# Patient Record
Sex: FEMALE | Race: WHITE | NOT HISPANIC OR LATINO | Employment: OTHER
[De-identification: names, ages, dates, MRNs, and addresses within clinical notes are randomized per-mention and may not be internally consistent; named-entity substitution may affect disease eponyms.]

---

## 2017-04-19 ENCOUNTER — PRIOR ORIGINAL RECORDS (OUTPATIENT)
Dept: OTHER | Age: 67
End: 2017-04-19

## 2017-04-19 ENCOUNTER — HOSPITAL (OUTPATIENT)
Dept: OTHER | Age: 67
End: 2017-04-19
Attending: RADIOLOGY

## 2017-06-25 ENCOUNTER — LAB SERVICES (OUTPATIENT)
Dept: OTHER | Age: 67
End: 2017-06-25

## 2017-06-25 ENCOUNTER — HOSPITAL (OUTPATIENT)
Dept: OTHER | Age: 67
End: 2017-06-25
Attending: INTERNAL MEDICINE

## 2017-06-25 ENCOUNTER — IMAGING SERVICES (OUTPATIENT)
Dept: OTHER | Age: 67
End: 2017-06-25

## 2017-06-25 ENCOUNTER — PRIOR ORIGINAL RECORDS (OUTPATIENT)
Dept: OTHER | Age: 67
End: 2017-06-25

## 2017-06-25 LAB
ANALYZER ANC (IANC): NORMAL
ANALYZER ANC (IANC): NORMAL
ANION GAP SERPL CALC-SCNC: 17 MMOL/L (ref 10–20)
ANION GAP SERPL CALC-SCNC: 17 MMOL/L (ref 10–20)
APTT PPP: 26 SEC (ref 22–30)
APTT PPP: 26 SECONDS (ref 22–30)
APTT PPP: 50 SECONDS (ref 22–30)
APTT PPP: ABNORMAL S
APTT PPP: NORMAL
APTT PPP: NORMAL S
BASOPHILS # BLD: 0 K/MCL (ref 0–0.3)
BASOPHILS # BLD: 0 THOUSAND/MCL (ref 0–0.3)
BASOPHILS NFR BLD: 0 %
BASOPHILS NFR BLD: 0 %
BUN SERPL-MCNC: 11 MG/DL (ref 6–20)
BUN SERPL-MCNC: 11 MG/DL (ref 6–20)
BUN/CREAT SERPL: 16 (ref 7–25)
BUN/CREAT SERPL: 16 (ref 7–25)
CALCIUM SERPL-MCNC: 9.1 MG/DL (ref 8.4–10.2)
CALCIUM SERPL-MCNC: 9.1 MG/DL (ref 8.4–10.2)
CHLORIDE SERPL-SCNC: 106 MMOL/L (ref 98–107)
CHLORIDE: 106 MMOL/L (ref 98–107)
CO2 SERPL-SCNC: 23 MMOL/L (ref 21–32)
CO2 SERPL-SCNC: 23 MMOL/L (ref 21–32)
CREAT SERPL-MCNC: 0.69 MG/DL (ref 0.51–0.95)
CREAT SERPL-MCNC: 0.69 MG/DL (ref 0.51–0.95)
DIFFERENTIAL METHOD BLD: NORMAL
DIFFERENTIAL METHOD BLD: NORMAL
EOSINOPHIL # BLD: 0.1 K/MCL (ref 0.1–0.5)
EOSINOPHIL # BLD: 0.1 THOUSAND/MCL (ref 0.1–0.5)
EOSINOPHIL NFR BLD: 1 %
EOSINOPHIL NFR BLD: 1 %
ERYTHROCYTE [DISTWIDTH] IN BLOOD: 13.1 % (ref 11–15)
ERYTHROCYTE [DISTWIDTH] IN BLOOD: 13.1 % (ref 11–15)
GLUCOSE SERPL-MCNC: 104 MG/DL (ref 65–99)
GLUCOSE SERPL-MCNC: 104 MG/DL (ref 65–99)
HEMATOCRIT: 43.5 % (ref 36–46.5)
HEMATOCRIT: 43.5 % (ref 36–46.5)
HEMOGLOBIN: 15 G/DL (ref 12–15.5)
HGB BLD-MCNC: 15 GM/DL (ref 12–15.5)
INR PPP: 1
INR PPP: 1
LYMPHOCYTES # BLD: 2.7 K/MCL (ref 1–4)
LYMPHOCYTES # BLD: 2.7 THOUSAND/MCL (ref 1–4)
LYMPHOCYTES NFR BLD: 28 %
LYMPHOCYTES NFR BLD: 28 %
MAGNESIUM SERPL-MCNC: 2.1 MG/DL (ref 1.7–2.4)
MCH RBC QN AUTO: 32.8 PG (ref 26–34)
MCHC RBC AUTO-ENTMCNC: 34.5 GM/DL (ref 32–36.5)
MCV RBC AUTO: 95.2 FL (ref 78–100)
MEAN CORPUSCULAR HEMOGLOBIN: 32.8 PG (ref 26–34)
MEAN CORPUSCULAR HGB CONC: 34.5 G/DL (ref 32–36.5)
MEAN CORPUSCULAR VOLUME: 95.2 FL (ref 78–100)
MONOCYTES # BLD: 0.6 K/MCL (ref 0.3–0.9)
MONOCYTES # BLD: 0.6 THOUSAND/MCL (ref 0.3–0.9)
MONOCYTES NFR BLD: 6 %
MONOCYTES NFR BLD: 6 %
NEUTROPHILS # BLD: 6.2 K/MCL (ref 1.8–7.7)
NEUTROPHILS # BLD: 6.2 THOUSAND/MCL (ref 1.8–7.7)
NEUTROPHILS NFR BLD: 65 %
NEUTROPHILS NFR BLD: 65 %
NEUTS SEG NFR BLD: NORMAL
NEUTS SEG NFR BLD: NORMAL %
NRBC (NRBCRE): NORMAL
PERCENT NRBC: NORMAL
PLATELET # BLD: 231 THOUSAND/MCL (ref 140–450)
PLATELET COUNT: 231 K/MCL (ref 140–450)
POTASSIUM SERPL-SCNC: 4.1 MMOL/L (ref 3.4–5.1)
POTASSIUM SERPL-SCNC: 4.1 MMOL/L (ref 3.4–5.1)
PROTHROMBIN TIME (PRT2): NORMAL
PROTHROMBIN TIME: 10.6 SEC (ref 9.7–11.8)
PROTHROMBIN TIME: 10.6 SECONDS (ref 9.7–11.8)
PROTHROMBIN TIME: NORMAL
RBC # BLD: 4.57 MILLION/MCL (ref 4–5.2)
RED CELL COUNT: 4.57 MIL/MCL (ref 4–5.2)
SODIUM SERPL-SCNC: 142 MMOL/L (ref 135–145)
SODIUM SERPL-SCNC: 142 MMOL/L (ref 135–145)
TROPONIN I SERPL HS-MCNC: 0.04 NG/ML
TROPONIN I SERPL HS-MCNC: <0.02 NG/ML
TROPONIN I SERPL HS-MCNC: <0.02 NG/ML
WBC # BLD: 9.7 THOUSAND/MCL (ref 4.2–11)
WHITE BLOOD COUNT: 9.7 K/MCL (ref 4.2–11)

## 2017-06-26 ENCOUNTER — CHARTING TRANS (OUTPATIENT)
Dept: OTHER | Age: 67
End: 2017-06-26

## 2017-06-26 ENCOUNTER — IMAGING SERVICES (OUTPATIENT)
Dept: OTHER | Age: 67
End: 2017-06-26

## 2017-06-26 ENCOUNTER — DIAGNOSTIC TRANS (OUTPATIENT)
Dept: OTHER | Age: 67
End: 2017-06-26

## 2017-06-26 LAB
ANALYZER ANC (IANC): NORMAL
APTT PPP: 44 SECONDS (ref 22–30)
APTT PPP: 44 SECONDS (ref 22–30)
APTT PPP: 46 SECONDS (ref 22–30)
APTT PPP: ABNORMAL S
CHOLEST SERPL-MCNC: 190 MG/DL
CHOLEST/HDLC SERPL: 2.9 {RATIO}
CREAT SERPL-MCNC: 0.64 MG/DL (ref 0.51–0.95)
ERYTHROCYTE [DISTWIDTH] IN BLOOD: 13.1 % (ref 11–15)
HDLC SERPL-MCNC: 66 MG/DL
HEMATOCRIT: 40.2 % (ref 36–46.5)
HGB BLD-MCNC: 13.6 GM/DL (ref 12–15.5)
LDLC SERPL CALC-MCNC: 108 MG/DL
MAGNESIUM SERPL-MCNC: 2.2 MG/DL (ref 1.7–2.4)
MCH RBC QN AUTO: 32 PG (ref 26–34)
MCHC RBC AUTO-ENTMCNC: 33.8 GM/DL (ref 32–36.5)
MCV RBC AUTO: 94.6 FL (ref 78–100)
NONHDLC SERPL-MCNC: 124 MG/DL
PLATELET # BLD: 210 THOUSAND/MCL (ref 140–450)
POTASSIUM SERPL-SCNC: 3.9 MMOL/L (ref 3.4–5.1)
RBC # BLD: 4.25 MILLION/MCL (ref 4–5.2)
TRIGLYCERIDE (TRIGP): 78 MG/DL
TROPONIN I SERPL HS-MCNC: 0.03 NG/ML
TROPONIN I SERPL HS-MCNC: 0.03 NG/ML
WBC # BLD: 7.6 THOUSAND/MCL (ref 4.2–11)

## 2017-07-03 ENCOUNTER — HOSPITAL (OUTPATIENT)
Dept: OTHER | Age: 67
End: 2017-07-03
Attending: INTERNAL MEDICINE

## 2017-07-03 LAB — INR BLDC: 1.2

## 2017-07-06 LAB — INR BLDC: 1.8

## 2017-07-11 LAB — INR BLDC: 2.3

## 2017-07-18 LAB — INR BLDC: 2.5

## 2017-07-25 LAB — INR BLDC: 1.6

## 2017-08-01 ENCOUNTER — HOSPITAL (OUTPATIENT)
Dept: OTHER | Age: 67
End: 2017-08-01
Attending: INTERNAL MEDICINE

## 2017-08-01 LAB — INR BLDC: 2.2

## 2017-08-02 ENCOUNTER — PRIOR ORIGINAL RECORDS (OUTPATIENT)
Dept: OTHER | Age: 67
End: 2017-08-02

## 2017-08-07 ENCOUNTER — MYAURORA ACCOUNT LINK (OUTPATIENT)
Dept: OTHER | Age: 67
End: 2017-08-07

## 2017-08-15 LAB — INR BLDC: 2.4

## 2017-09-01 ENCOUNTER — HOSPITAL (OUTPATIENT)
Dept: OTHER | Age: 67
End: 2017-09-01
Attending: INTERNAL MEDICINE

## 2017-09-07 LAB — INR BLDC: 1.6

## 2017-09-14 LAB — INR BLDC: 2.3

## 2017-09-19 ENCOUNTER — HOSPITAL (OUTPATIENT)
Dept: OTHER | Age: 67
End: 2017-09-19
Attending: INTERNAL MEDICINE

## 2017-09-22 ENCOUNTER — PRIOR ORIGINAL RECORDS (OUTPATIENT)
Dept: OTHER | Age: 67
End: 2017-09-22

## 2017-09-25 ENCOUNTER — PRIOR ORIGINAL RECORDS (OUTPATIENT)
Dept: OTHER | Age: 67
End: 2017-09-25

## 2017-09-27 ENCOUNTER — PRIOR ORIGINAL RECORDS (OUTPATIENT)
Dept: OTHER | Age: 67
End: 2017-09-27

## 2017-09-28 ENCOUNTER — PRIOR ORIGINAL RECORDS (OUTPATIENT)
Dept: OTHER | Age: 67
End: 2017-09-28

## 2017-09-29 ENCOUNTER — PRIOR ORIGINAL RECORDS (OUTPATIENT)
Dept: OTHER | Age: 67
End: 2017-09-29

## 2017-10-12 ENCOUNTER — CHARTING TRANS (OUTPATIENT)
Dept: OTHER | Age: 67
End: 2017-10-12

## 2017-10-23 LAB — COLONOSCOPY STUDY: NORMAL

## 2017-10-25 ENCOUNTER — PRIOR ORIGINAL RECORDS (OUTPATIENT)
Dept: OTHER | Age: 67
End: 2017-10-25

## 2017-10-31 ENCOUNTER — CHARTING TRANS (OUTPATIENT)
Dept: OTHER | Age: 67
End: 2017-10-31

## 2017-10-31 LAB
BUN: 11 MG/DL
CALCIUM: 9.1 MG/DL
CHLORIDE: 106 MEQ/L
CREATININE, SERUM: 0.69 MG/DL
GLUCOSE: 104 MG/DL
HEMATOCRIT: 43.5 %
HEMOGLOBIN: 15 G/DL
MCH: 32.8 PG
MCHC: 34.5 G/DL
MCV: 95.2 FL
PLATELETS: 231 K/UL
POTASSIUM, SERUM: 4.1 MEQ/L
RED BLOOD COUNT: 4.57 X 10-6/U
SODIUM: 142 MEQ/L
WHITE BLOOD COUNT: 9.7 X 10-3/U

## 2017-11-03 ENCOUNTER — PRIOR ORIGINAL RECORDS (OUTPATIENT)
Dept: OTHER | Age: 67
End: 2017-11-03

## 2017-11-10 ENCOUNTER — PRIOR ORIGINAL RECORDS (OUTPATIENT)
Dept: OTHER | Age: 67
End: 2017-11-10

## 2018-01-08 ENCOUNTER — HOSPITAL (OUTPATIENT)
Dept: OTHER | Age: 68
End: 2018-01-08
Attending: RADIOLOGY

## 2018-02-03 ENCOUNTER — HOSPITAL (OUTPATIENT)
Dept: OTHER | Age: 68
End: 2018-02-03
Attending: INTERNAL MEDICINE

## 2018-02-03 LAB
ANA SER QL IA: NEGATIVE
FREE T4: 1 NG/DL (ref 0.8–1.5)
IRON SATN MFR SERPL: 39 % (ref 15–45)
IRON SERPL-MCNC: 121 MCG/DL (ref 50–170)
TESTOST FREE SERPL-MCNC: 2 PG/ML
TESTOST SERPL-MCNC: 25 NG/DL
TIBC SERPL-MCNC: 314 MCG/DL (ref 250–450)
TSH SERPL-ACNC: 3.99 MCUNIT/ML (ref 0.35–5)

## 2018-02-19 ENCOUNTER — CHARTING TRANS (OUTPATIENT)
Dept: OTHER | Age: 68
End: 2018-02-19

## 2018-04-16 ENCOUNTER — MYAURORA ACCOUNT LINK (OUTPATIENT)
Dept: OTHER | Age: 68
End: 2018-04-16

## 2018-04-20 ENCOUNTER — PRIOR ORIGINAL RECORDS (OUTPATIENT)
Dept: OTHER | Age: 68
End: 2018-04-20

## 2018-04-23 ENCOUNTER — LAB ENCOUNTER (OUTPATIENT)
Dept: LAB | Age: 68
End: 2018-04-23
Attending: PHYSICIAN ASSISTANT
Payer: MEDICARE

## 2018-04-23 DIAGNOSIS — L08.9 LOCAL INFECTION OF THE SKIN AND SUBCUTANEOUS TISSUE, UNSPECIFIED: ICD-10-CM

## 2018-04-23 PROCEDURE — 87070 CULTURE OTHR SPECIMN AEROBIC: CPT

## 2018-04-23 PROCEDURE — 87205 SMEAR GRAM STAIN: CPT

## 2018-04-23 PROCEDURE — 87147 CULTURE TYPE IMMUNOLOGIC: CPT

## 2018-04-23 PROCEDURE — 87186 SC STD MICRODIL/AGAR DIL: CPT

## 2018-05-23 ENCOUNTER — HOSPITAL (OUTPATIENT)
Dept: OTHER | Age: 68
End: 2018-05-23
Attending: RADIOLOGY

## 2018-05-23 ENCOUNTER — PRIOR ORIGINAL RECORDS (OUTPATIENT)
Dept: OTHER | Age: 68
End: 2018-05-23

## 2018-05-23 ENCOUNTER — LAB ENCOUNTER (OUTPATIENT)
Dept: LAB | Age: 68
End: 2018-05-23
Attending: PHYSICIAN ASSISTANT
Payer: MEDICARE

## 2018-05-23 DIAGNOSIS — L08.9 LOCAL INFECTION OF THE SKIN AND SUBCUTANEOUS TISSUE, UNSPECIFIED: ICD-10-CM

## 2018-05-23 PROCEDURE — 87070 CULTURE OTHR SPECIMN AEROBIC: CPT

## 2018-05-23 PROCEDURE — 87205 SMEAR GRAM STAIN: CPT

## 2018-07-09 ENCOUNTER — HOSPITAL (OUTPATIENT)
Dept: OTHER | Age: 68
End: 2018-07-09
Attending: RADIOLOGY

## 2018-10-17 ENCOUNTER — HOSPITAL (OUTPATIENT)
Dept: OTHER | Age: 68
End: 2018-10-17
Attending: NURSE PRACTITIONER

## 2018-10-17 ENCOUNTER — IMAGING SERVICES (OUTPATIENT)
Dept: OTHER | Age: 68
End: 2018-10-17

## 2018-10-17 ENCOUNTER — CHARTING TRANS (OUTPATIENT)
Dept: OTHER | Age: 68
End: 2018-10-17

## 2018-10-23 ENCOUNTER — IMAGING SERVICES (OUTPATIENT)
Dept: OTHER | Age: 68
End: 2018-10-23

## 2018-10-23 ENCOUNTER — HOSPITAL (OUTPATIENT)
Dept: OTHER | Age: 68
End: 2018-10-23
Attending: NURSE PRACTITIONER

## 2018-10-24 ENCOUNTER — CHARTING TRANS (OUTPATIENT)
Dept: OTHER | Age: 68
End: 2018-10-24

## 2018-10-31 ENCOUNTER — MYAURORA ACCOUNT LINK (OUTPATIENT)
Dept: OTHER | Age: 68
End: 2018-10-31

## 2018-10-31 ENCOUNTER — PRIOR ORIGINAL RECORDS (OUTPATIENT)
Dept: OTHER | Age: 68
End: 2018-10-31

## 2018-11-01 VITALS
HEIGHT: 67 IN | HEART RATE: 72 BPM | OXYGEN SATURATION: 98 % | TEMPERATURE: 98 F | WEIGHT: 151.8 LBS | RESPIRATION RATE: 18 BRPM | SYSTOLIC BLOOD PRESSURE: 108 MMHG | DIASTOLIC BLOOD PRESSURE: 68 MMHG | BODY MASS INDEX: 23.83 KG/M2

## 2018-11-02 VITALS
TEMPERATURE: 98.5 F | HEART RATE: 61 BPM | BODY MASS INDEX: 22.87 KG/M2 | WEIGHT: 145.7 LBS | HEIGHT: 67 IN | RESPIRATION RATE: 14 BRPM | SYSTOLIC BLOOD PRESSURE: 102 MMHG | DIASTOLIC BLOOD PRESSURE: 72 MMHG

## 2018-11-02 VITALS
HEIGHT: 67 IN | RESPIRATION RATE: 16 BRPM | BODY MASS INDEX: 23.67 KG/M2 | SYSTOLIC BLOOD PRESSURE: 110 MMHG | HEART RATE: 72 BPM | WEIGHT: 150.79 LBS | DIASTOLIC BLOOD PRESSURE: 70 MMHG | TEMPERATURE: 98.5 F | OXYGEN SATURATION: 98 %

## 2018-11-14 ENCOUNTER — CHARTING TRANS (OUTPATIENT)
Dept: OTHER | Age: 68
End: 2018-11-14

## 2018-11-14 ENCOUNTER — HOSPITAL (OUTPATIENT)
Dept: OTHER | Age: 68
End: 2018-11-14
Attending: NURSE PRACTITIONER

## 2018-11-14 ENCOUNTER — IMAGING SERVICES (OUTPATIENT)
Dept: OTHER | Age: 68
End: 2018-11-14

## 2018-11-19 ENCOUNTER — CHARTING TRANS (OUTPATIENT)
Dept: OTHER | Age: 68
End: 2018-11-19

## 2018-11-27 VITALS
BODY MASS INDEX: 23.7 KG/M2 | OXYGEN SATURATION: 99 % | HEIGHT: 67 IN | WEIGHT: 150.99 LBS | DIASTOLIC BLOOD PRESSURE: 72 MMHG | SYSTOLIC BLOOD PRESSURE: 109 MMHG | HEART RATE: 65 BPM

## 2018-11-28 ENCOUNTER — CHARTING TRANS (OUTPATIENT)
Dept: OTHER | Age: 68
End: 2018-11-28

## 2018-12-01 ENCOUNTER — PRIOR ORIGINAL RECORDS (OUTPATIENT)
Dept: HEALTH INFORMATION MANAGEMENT | Facility: OTHER | Age: 68
End: 2018-12-01

## 2018-12-03 ENCOUNTER — APPOINTMENT (OUTPATIENT)
Dept: NEUROSURGERY | Age: 68
End: 2018-12-03

## 2018-12-05 ENCOUNTER — SCAN ENCOUNTER (OUTPATIENT)
Dept: NEUROSURGERY | Age: 68
End: 2018-12-05

## 2018-12-05 ENCOUNTER — HOSPITAL (OUTPATIENT)
Dept: OTHER | Age: 68
End: 2018-12-05
Attending: NURSE PRACTITIONER

## 2018-12-10 ENCOUNTER — HOSPITAL (OUTPATIENT)
Dept: OTHER | Age: 68
End: 2018-12-10

## 2018-12-10 ENCOUNTER — OFF PREMISE (OUTPATIENT)
Dept: NEUROSURGERY | Age: 68
End: 2018-12-10

## 2018-12-10 ENCOUNTER — OFFICE VISIT (OUTPATIENT)
Dept: NEUROSURGERY | Age: 68
End: 2018-12-10

## 2018-12-10 ENCOUNTER — IMAGING SERVICES (OUTPATIENT)
Dept: GENERAL RADIOLOGY | Age: 68
End: 2018-12-10

## 2018-12-10 DIAGNOSIS — S32.050G CLOSED COMPRESSION FRACTURE OF L5 LUMBAR VERTEBRA WITH DELAYED HEALING, SUBSEQUENT ENCOUNTER: Primary | ICD-10-CM

## 2018-12-10 PROCEDURE — 72100 X-RAY EXAM L-S SPINE 2/3 VWS: CPT | Performed by: NURSE PRACTITIONER

## 2018-12-10 PROCEDURE — 99213 OFFICE O/P EST LOW 20 MIN: CPT | Performed by: NURSE PRACTITIONER

## 2018-12-10 RX ORDER — ESCITALOPRAM OXALATE 10 MG/1
TABLET ORAL DAILY
COMMUNITY
Start: 2018-04-02 | End: 2019-01-21 | Stop reason: SDUPTHER

## 2018-12-10 RX ORDER — FLECAINIDE ACETATE 50 MG/1
TABLET ORAL
COMMUNITY
Start: 2018-04-05 | End: 2019-04-29 | Stop reason: SDUPTHER

## 2018-12-10 RX ORDER — TRAMADOL HYDROCHLORIDE 50 MG/1
TABLET ORAL
Refills: 0 | COMMUNITY
Start: 2018-11-23 | End: 2019-04-29 | Stop reason: SDUPTHER

## 2018-12-10 SDOH — HEALTH STABILITY: MENTAL HEALTH: HOW OFTEN DO YOU HAVE A DRINK CONTAINING ALCOHOL?: NEVER

## 2018-12-10 ASSESSMENT — ENCOUNTER SYMPTOMS
GASTROINTESTINAL NEGATIVE: 1
PSYCHIATRIC NEGATIVE: 1
HEMATOLOGIC/LYMPHATIC NEGATIVE: 1
EYES NEGATIVE: 1
NUMBNESS: 1
ALLERGIC/IMMUNOLOGIC NEGATIVE: 1
CONSTITUTIONAL NEGATIVE: 1
RESPIRATORY NEGATIVE: 1
ENDOCRINE NEGATIVE: 1

## 2019-01-01 ENCOUNTER — EXTERNAL RECORD (OUTPATIENT)
Dept: HEALTH INFORMATION MANAGEMENT | Facility: OTHER | Age: 69
End: 2019-01-01

## 2019-01-02 RX ORDER — IBANDRONATE SODIUM 150 MG/1
TABLET, FILM COATED ORAL
COMMUNITY
End: 2020-06-19 | Stop reason: SDUPTHER

## 2019-01-02 RX ORDER — ACETAMINOPHEN/DIPHENHYDRAMINE 500MG-25MG
TABLET ORAL
COMMUNITY
End: 2020-11-18 | Stop reason: ALTCHOICE

## 2019-01-02 RX ORDER — VARENICLINE TARTRATE 1 MG/1
TABLET, FILM COATED ORAL
COMMUNITY
End: 2020-06-19 | Stop reason: SDUPTHER

## 2019-01-08 ENCOUNTER — IMAGING SERVICES (OUTPATIENT)
Dept: GENERAL RADIOLOGY | Age: 69
End: 2019-01-08

## 2019-01-08 ENCOUNTER — OFFICE VISIT (OUTPATIENT)
Dept: NEUROSURGERY | Age: 69
End: 2019-01-08

## 2019-01-08 ENCOUNTER — HOSPITAL (OUTPATIENT)
Dept: OTHER | Age: 69
End: 2019-01-08

## 2019-01-08 DIAGNOSIS — S32.050G CLOSED COMPRESSION FRACTURE OF L5 LUMBAR VERTEBRA WITH DELAYED HEALING, SUBSEQUENT ENCOUNTER: Primary | ICD-10-CM

## 2019-01-08 DIAGNOSIS — G56.03 BILATERAL CARPAL TUNNEL SYNDROME: ICD-10-CM

## 2019-01-08 PROCEDURE — 72100 X-RAY EXAM L-S SPINE 2/3 VWS: CPT | Performed by: INTERNAL MEDICINE

## 2019-01-08 PROCEDURE — 99213 OFFICE O/P EST LOW 20 MIN: CPT | Performed by: NURSE PRACTITIONER

## 2019-01-08 SDOH — HEALTH STABILITY: MENTAL HEALTH: HOW OFTEN DO YOU HAVE A DRINK CONTAINING ALCOHOL?: NEVER

## 2019-01-08 ASSESSMENT — ENCOUNTER SYMPTOMS
PSYCHIATRIC NEGATIVE: 1
ALLERGIC/IMMUNOLOGIC NEGATIVE: 1
GASTROINTESTINAL NEGATIVE: 1
CONSTITUTIONAL NEGATIVE: 1
EYES NEGATIVE: 1
RESPIRATORY NEGATIVE: 1
NUMBNESS: 1
ENDOCRINE NEGATIVE: 1
HEMATOLOGIC/LYMPHATIC NEGATIVE: 1

## 2019-01-08 ASSESSMENT — PAIN SCALES - GENERAL: PAINLEVEL: 0

## 2019-01-14 VITALS
HEART RATE: 64 BPM | WEIGHT: 145.99 LBS | TEMPERATURE: 98.4 F | SYSTOLIC BLOOD PRESSURE: 110 MMHG | OXYGEN SATURATION: 98 % | RESPIRATION RATE: 16 BRPM | BODY MASS INDEX: 22.91 KG/M2 | HEIGHT: 67 IN | DIASTOLIC BLOOD PRESSURE: 64 MMHG

## 2019-01-16 ENCOUNTER — HOSPITAL (OUTPATIENT)
Dept: OTHER | Age: 69
End: 2019-01-16
Attending: NURSE PRACTITIONER

## 2019-01-21 RX ORDER — ESCITALOPRAM OXALATE 10 MG/1
TABLET ORAL
Qty: 30 TABLET | Refills: 2 | Status: SHIPPED | OUTPATIENT
Start: 2019-01-21 | End: 2019-04-15 | Stop reason: SDUPTHER

## 2019-02-19 ENCOUNTER — HOSPITAL (OUTPATIENT)
Dept: OTHER | Age: 69
End: 2019-02-19
Attending: SURGERY

## 2019-02-22 ENCOUNTER — HOSPITAL (OUTPATIENT)
Dept: OTHER | Age: 69
End: 2019-02-22
Attending: NURSE PRACTITIONER

## 2019-02-22 ENCOUNTER — TELEPHONE (OUTPATIENT)
Dept: SURGERY | Age: 69
End: 2019-02-22

## 2019-02-28 ENCOUNTER — HOSPITAL (OUTPATIENT)
Dept: OTHER | Age: 69
End: 2019-02-28
Attending: NEUROLOGICAL SURGERY

## 2019-02-28 ENCOUNTER — TELEPHONE (OUTPATIENT)
Dept: NEUROSURGERY | Age: 69
End: 2019-02-28

## 2019-02-28 VITALS
BODY MASS INDEX: 24.16 KG/M2 | DIASTOLIC BLOOD PRESSURE: 64 MMHG | SYSTOLIC BLOOD PRESSURE: 100 MMHG | HEIGHT: 65 IN | WEIGHT: 145 LBS | RESPIRATION RATE: 16 BRPM | HEART RATE: 51 BPM

## 2019-02-28 VITALS
HEART RATE: 60 BPM | BODY MASS INDEX: 23.82 KG/M2 | DIASTOLIC BLOOD PRESSURE: 52 MMHG | SYSTOLIC BLOOD PRESSURE: 100 MMHG | HEIGHT: 65 IN | WEIGHT: 143 LBS | RESPIRATION RATE: 16 BRPM

## 2019-02-28 VITALS
HEIGHT: 65 IN | HEART RATE: 68 BPM | DIASTOLIC BLOOD PRESSURE: 60 MMHG | SYSTOLIC BLOOD PRESSURE: 100 MMHG | BODY MASS INDEX: 24.83 KG/M2 | WEIGHT: 149 LBS

## 2019-02-28 DIAGNOSIS — S32.050S CLOSED COMPRESSION FRACTURE OF FIFTH LUMBAR VERTEBRA, SEQUELA: Primary | ICD-10-CM

## 2019-02-28 DIAGNOSIS — M54.50 ACUTE BILATERAL LOW BACK PAIN WITHOUT SCIATICA: ICD-10-CM

## 2019-03-01 ENCOUNTER — HOSPITAL (OUTPATIENT)
Dept: OTHER | Age: 69
End: 2019-03-01
Attending: NURSE PRACTITIONER

## 2019-03-01 ENCOUNTER — TELEPHONE (OUTPATIENT)
Dept: NEUROSURGERY | Age: 69
End: 2019-03-01

## 2019-03-04 ENCOUNTER — TELEPHONE (OUTPATIENT)
Dept: NEUROSURGERY | Age: 69
End: 2019-03-04

## 2019-03-04 DIAGNOSIS — S32.059S: ICD-10-CM

## 2019-03-04 DIAGNOSIS — M53.3 SACRAL PAIN: Primary | ICD-10-CM

## 2019-03-07 ENCOUNTER — TELEPHONE (OUTPATIENT)
Dept: FAMILY MEDICINE | Age: 69
End: 2019-03-07

## 2019-03-07 ENCOUNTER — TELEPHONE (OUTPATIENT)
Dept: SCHEDULING | Age: 69
End: 2019-03-07

## 2019-03-07 DIAGNOSIS — R91.1 LUNG NODULE: Primary | ICD-10-CM

## 2019-03-08 ENCOUNTER — HOSPITAL (OUTPATIENT)
Dept: OTHER | Age: 69
End: 2019-03-08
Attending: NURSE PRACTITIONER

## 2019-03-11 ENCOUNTER — TELEPHONE (OUTPATIENT)
Dept: FAMILY MEDICINE | Age: 69
End: 2019-03-11

## 2019-03-12 ENCOUNTER — TELEPHONE (OUTPATIENT)
Dept: NEUROSURGERY | Age: 69
End: 2019-03-12

## 2019-03-15 ENCOUNTER — OFFICE VISIT (OUTPATIENT)
Dept: NEUROSURGERY | Age: 69
End: 2019-03-15

## 2019-03-15 ENCOUNTER — APPOINTMENT (OUTPATIENT)
Dept: GENERAL RADIOLOGY | Age: 69
End: 2019-03-15

## 2019-03-15 ENCOUNTER — HOSPITAL (OUTPATIENT)
Dept: OTHER | Age: 69
End: 2019-03-15
Attending: PHYSICIAN ASSISTANT

## 2019-03-15 ENCOUNTER — HOSPITAL (OUTPATIENT)
Dept: OTHER | Age: 69
End: 2019-03-15

## 2019-03-15 VITALS
RESPIRATION RATE: 16 BRPM | HEART RATE: 68 BPM | BODY MASS INDEX: 22.91 KG/M2 | DIASTOLIC BLOOD PRESSURE: 83 MMHG | HEIGHT: 67 IN | OXYGEN SATURATION: 98 % | SYSTOLIC BLOOD PRESSURE: 136 MMHG | WEIGHT: 146 LBS

## 2019-03-15 DIAGNOSIS — M54.50 ACUTE BILATERAL LOW BACK PAIN WITHOUT SCIATICA: Primary | ICD-10-CM

## 2019-03-15 DIAGNOSIS — S32.040A CLOSED COMPRESSION FRACTURE OF FOURTH LUMBAR VERTEBRA, INITIAL ENCOUNTER: ICD-10-CM

## 2019-03-15 DIAGNOSIS — S32.050D CLOSED COMPRESSION FRACTURE OF L5 LUMBAR VERTEBRA WITH ROUTINE HEALING, SUBSEQUENT ENCOUNTER: ICD-10-CM

## 2019-03-15 PROBLEM — S32.050A COMPRESSION FRACTURE OF FIFTH LUMBAR VERTEBRA (CMD): Status: ACTIVE | Noted: 2019-03-15

## 2019-03-15 PROBLEM — M54.9 CHRONIC RIGHT-SIDED BACK PAIN: Status: ACTIVE | Noted: 2019-03-15

## 2019-03-15 PROBLEM — F41.9 ANXIETY: Status: ACTIVE | Noted: 2019-03-15

## 2019-03-15 PROBLEM — R20.0 BILATERAL HAND NUMBNESS: Status: ACTIVE | Noted: 2019-03-15

## 2019-03-15 PROBLEM — G89.29 CHRONIC RIGHT-SIDED BACK PAIN: Status: ACTIVE | Noted: 2019-03-15

## 2019-03-15 PROBLEM — D75.89 MACROCYTOSIS: Status: ACTIVE | Noted: 2019-03-15

## 2019-03-15 PROBLEM — I48.0 PAROXYSMAL ATRIAL FIBRILLATION (CMD): Status: ACTIVE | Noted: 2019-03-15

## 2019-03-15 PROBLEM — M85.80 OSTEOPENIA: Status: ACTIVE | Noted: 2019-03-15

## 2019-03-15 PROBLEM — M54.9 BACK PAIN: Status: ACTIVE | Noted: 2019-03-15

## 2019-03-15 PROBLEM — M65.30 TRIGGER FINGER: Status: ACTIVE | Noted: 2019-03-15

## 2019-03-15 PROCEDURE — 99213 OFFICE O/P EST LOW 20 MIN: CPT | Performed by: PHYSICIAN ASSISTANT

## 2019-03-15 ASSESSMENT — ENCOUNTER SYMPTOMS
CONSTITUTIONAL NEGATIVE: 1
BACK PAIN: 1

## 2019-03-18 ENCOUNTER — TELEPHONE (OUTPATIENT)
Dept: NEUROSURGERY | Age: 69
End: 2019-03-18

## 2019-03-19 ENCOUNTER — TELEPHONE (OUTPATIENT)
Dept: NEUROSURGERY | Age: 69
End: 2019-03-19

## 2019-04-10 ENCOUNTER — TELEPHONE (OUTPATIENT)
Dept: NEUROSURGERY | Age: 69
End: 2019-04-10

## 2019-04-15 RX ORDER — ESCITALOPRAM OXALATE 10 MG/1
TABLET ORAL
Qty: 30 TABLET | Refills: 1 | Status: SHIPPED | OUTPATIENT
Start: 2019-04-15 | End: 2019-06-09 | Stop reason: SDUPTHER

## 2019-04-29 RX ORDER — TRAMADOL HYDROCHLORIDE 50 MG/1
50 TABLET ORAL PRN
COMMUNITY
Start: 2018-10-31 | End: 2020-06-19 | Stop reason: SDUPTHER

## 2019-04-29 RX ORDER — ASPIRIN 81 MG/1
81 TABLET, CHEWABLE ORAL DAILY
Status: ON HOLD | COMMUNITY
Start: 2017-09-25 | End: 2023-02-06 | Stop reason: HOSPADM

## 2019-04-29 RX ORDER — FLECAINIDE ACETATE 50 MG/1
50 TABLET ORAL 2 TIMES DAILY
COMMUNITY
Start: 2018-09-24 | End: 2019-06-12 | Stop reason: SDUPTHER

## 2019-04-29 RX ORDER — MULTIVIT-MIN/IRON/FOLIC ACID/K 18-600-40
2000 CAPSULE ORAL 2 TIMES DAILY
COMMUNITY
Start: 2018-11-28 | End: 2019-11-28

## 2019-05-01 ENCOUNTER — OFFICE VISIT (OUTPATIENT)
Dept: CARDIOLOGY | Age: 69
End: 2019-05-01

## 2019-05-01 VITALS
HEIGHT: 67 IN | SYSTOLIC BLOOD PRESSURE: 120 MMHG | RESPIRATION RATE: 12 BRPM | DIASTOLIC BLOOD PRESSURE: 66 MMHG | BODY MASS INDEX: 25.27 KG/M2 | HEART RATE: 60 BPM | WEIGHT: 161 LBS

## 2019-05-01 DIAGNOSIS — I48.0 PAROXYSMAL ATRIAL FIBRILLATION (CMD): Primary | ICD-10-CM

## 2019-05-01 PROCEDURE — 93000 ELECTROCARDIOGRAM COMPLETE: CPT | Performed by: INTERNAL MEDICINE

## 2019-05-01 PROCEDURE — 99215 OFFICE O/P EST HI 40 MIN: CPT | Performed by: INTERNAL MEDICINE

## 2019-05-01 SDOH — HEALTH STABILITY: PHYSICAL HEALTH: ON AVERAGE, HOW MANY MINUTES DO YOU ENGAGE IN EXERCISE AT THIS LEVEL?: 0 MIN

## 2019-05-01 SDOH — HEALTH STABILITY: PHYSICAL HEALTH: ON AVERAGE, HOW MANY DAYS PER WEEK DO YOU ENGAGE IN MODERATE TO STRENUOUS EXERCISE (LIKE A BRISK WALK)?: 0 DAYS

## 2019-05-01 ASSESSMENT — PATIENT HEALTH QUESTIONNAIRE - PHQ9
2. FEELING DOWN, DEPRESSED OR HOPELESS: NOT AT ALL
SUM OF ALL RESPONSES TO PHQ9 QUESTIONS 1 AND 2: 0
SUM OF ALL RESPONSES TO PHQ9 QUESTIONS 1 AND 2: 0
1. LITTLE INTEREST OR PLEASURE IN DOING THINGS: NOT AT ALL

## 2019-05-29 ENCOUNTER — HOSPITAL ENCOUNTER (OUTPATIENT)
Dept: MRI IMAGING | Age: 69
Discharge: HOME | End: 2019-05-29
Attending: ORTHOPAEDIC SURGERY
Payer: MEDICARE

## 2019-05-29 DIAGNOSIS — M48.56XA COLLAPSE OF LUMBAR VERTEBRA (HCC): ICD-10-CM

## 2019-05-29 PROCEDURE — 82565 ASSAY OF CREATININE: CPT

## 2019-05-29 PROCEDURE — 72158 MRI LUMBAR SPINE W/O & W/DYE: CPT | Performed by: ORTHOPAEDIC SURGERY

## 2019-05-29 PROCEDURE — A9575 INJ GADOTERATE MEGLUMI 0.1ML: HCPCS | Performed by: ORTHOPAEDIC SURGERY

## 2019-06-10 RX ORDER — ESCITALOPRAM OXALATE 10 MG/1
TABLET ORAL
Qty: 30 TABLET | Refills: 0 | Status: SHIPPED | OUTPATIENT
Start: 2019-06-10 | End: 2019-07-07 | Stop reason: SDUPTHER

## 2019-06-12 RX ORDER — FLECAINIDE ACETATE 50 MG/1
50 TABLET ORAL 2 TIMES DAILY
Qty: 180 TABLET | Refills: 3 | Status: SHIPPED | OUTPATIENT
Start: 2019-06-12 | End: 2020-05-27

## 2019-06-12 RX ORDER — FLECAINIDE ACETATE 50 MG/1
50 TABLET ORAL 2 TIMES DAILY
Status: CANCELLED | OUTPATIENT
Start: 2019-06-12

## 2019-06-26 ENCOUNTER — HOSPITAL (OUTPATIENT)
Dept: OTHER | Age: 69
End: 2019-06-26
Attending: RADIOLOGY

## 2019-06-26 ENCOUNTER — PRIOR ORIGINAL RECORDS (OUTPATIENT)
Dept: OTHER | Age: 69
End: 2019-06-26

## 2019-07-01 ENCOUNTER — HOSPITAL (OUTPATIENT)
Dept: OTHER | Age: 69
End: 2019-07-01
Attending: RADIOLOGY

## 2019-07-08 RX ORDER — ESCITALOPRAM OXALATE 10 MG/1
TABLET ORAL
Qty: 30 TABLET | Refills: 0 | Status: SHIPPED | OUTPATIENT
Start: 2019-07-08 | End: 2019-08-07 | Stop reason: SDUPTHER

## 2019-07-10 ENCOUNTER — APPOINTMENT (OUTPATIENT)
Dept: LAB | Facility: HOSPITAL | Age: 69
End: 2019-07-10
Attending: INTERNAL MEDICINE
Payer: MEDICARE

## 2019-07-10 PROCEDURE — 84166 PROTEIN E-PHORESIS/URINE/CSF: CPT

## 2019-07-10 PROCEDURE — 86335 IMMUNFIX E-PHORSIS/URINE/CSF: CPT

## 2019-07-10 PROCEDURE — 82043 UR ALBUMIN QUANTITATIVE: CPT

## 2019-07-10 PROCEDURE — 82570 ASSAY OF URINE CREATININE: CPT

## 2019-07-11 ENCOUNTER — LAB ENCOUNTER (OUTPATIENT)
Dept: LAB | Age: 69
End: 2019-07-11
Attending: INTERNAL MEDICINE
Payer: MEDICARE

## 2019-07-11 DIAGNOSIS — M81.0 OSTEOPOROSIS: Primary | ICD-10-CM

## 2019-07-11 LAB
ALBUMIN SERPL-MCNC: 3.7 G/DL (ref 3.4–5)
ALBUMIN/GLOB SERPL: 1.1 {RATIO} (ref 1–2)
ALP LIVER SERPL-CCNC: 62 U/L (ref 55–142)
ALT SERPL-CCNC: 19 U/L (ref 13–56)
ANION GAP SERPL CALC-SCNC: 5 MMOL/L (ref 0–18)
AST SERPL-CCNC: 18 U/L (ref 15–37)
BASOPHILS # BLD AUTO: 0.02 X10(3) UL (ref 0–0.2)
BASOPHILS NFR BLD AUTO: 0.3 %
BILIRUB SERPL-MCNC: 0.6 MG/DL (ref 0.1–2)
BUN BLD-MCNC: 14 MG/DL (ref 7–18)
BUN/CREAT SERPL: 18.9 (ref 10–20)
CALCIUM 24H UR-SRATE: 388 MG/24HR (ref 42–353)
CALCIUM BLD-MCNC: 9 MG/DL (ref 8.5–10.1)
CHLORIDE SERPL-SCNC: 110 MMOL/L (ref 98–112)
CO2 SERPL-SCNC: 27 MMOL/L (ref 21–32)
CREAT BLD-MCNC: 0.74 MG/DL (ref 0.55–1.02)
CREAT UR-SCNC: 1.43 G/24 HR (ref 0.6–1.8)
CREAT UR-SCNC: 37.9 MG/DL
DEPRECATED RDW RBC AUTO: 48.4 FL (ref 35.1–46.3)
EOSINOPHIL # BLD AUTO: 0.13 X10(3) UL (ref 0–0.7)
EOSINOPHIL NFR BLD AUTO: 1.8 %
ERYTHROCYTE [DISTWIDTH] IN BLOOD BY AUTOMATED COUNT: 13.2 % (ref 11–15)
GLOBULIN PLAS-MCNC: 3.3 G/DL (ref 2.8–4.4)
GLUCOSE BLD-MCNC: 92 MG/DL (ref 70–99)
HCT VFR BLD AUTO: 44.7 % (ref 35–48)
HGB BLD-MCNC: 14.7 G/DL (ref 12–16)
IMM GRANULOCYTES # BLD AUTO: 0.01 X10(3) UL (ref 0–1)
IMM GRANULOCYTES NFR BLD: 0.1 %
LYMPHOCYTES # BLD AUTO: 1.75 X10(3) UL (ref 1–4)
LYMPHOCYTES NFR BLD AUTO: 24.2 %
M PROTEIN MFR SERPL ELPH: 7 G/DL (ref 6.4–8.2)
MCH RBC QN AUTO: 32.8 PG (ref 26–34)
MCHC RBC AUTO-ENTMCNC: 32.9 G/DL (ref 31–37)
MCV RBC AUTO: 99.8 FL (ref 80–100)
MICROALBUMIN UR-MCNC: 0.95 MG/DL
MICROALBUMIN/CREAT 24H UR-RTO: 25.1 UG/MG (ref ?–30)
MONOCYTES # BLD AUTO: 0.5 X10(3) UL (ref 0.1–1)
MONOCYTES NFR BLD AUTO: 6.9 %
NEUTROPHILS # BLD AUTO: 4.83 X10 (3) UL (ref 1.5–7.7)
NEUTROPHILS # BLD AUTO: 4.83 X10(3) UL (ref 1.5–7.7)
NEUTROPHILS NFR BLD AUTO: 66.7 %
OSMOLALITY SERPL CALC.SUM OF ELEC: 294 MOSM/KG (ref 275–295)
PATIENT FASTING: YES
PLATELET # BLD AUTO: 223 10(3)UL (ref 150–450)
POTASSIUM SERPL-SCNC: 4.3 MMOL/L (ref 3.5–5.1)
PROT UR-MCNC: <5 MG/DL
PTH-INTACT SERPL-MCNC: 27 PG/ML (ref 18.5–88)
RBC # BLD AUTO: 4.48 X10(6)UL (ref 3.8–5.3)
SODIUM SERPL-SCNC: 142 MMOL/L (ref 136–145)
SPECIMEN VOL UR: 2280 ML
SPECIMEN VOL UR: 2280 ML
T4 FREE SERPL-MCNC: 0.9 NG/DL (ref 0.8–1.7)
TSI SER-ACNC: 2.59 MIU/ML (ref 0.36–3.74)
WBC # BLD AUTO: 7.2 X10(3) UL (ref 4–11)

## 2019-07-11 PROCEDURE — 80053 COMPREHEN METABOLIC PANEL: CPT

## 2019-07-11 PROCEDURE — 82570 ASSAY OF URINE CREATININE: CPT

## 2019-07-11 PROCEDURE — 82340 ASSAY OF CALCIUM IN URINE: CPT

## 2019-07-11 PROCEDURE — 82306 VITAMIN D 25 HYDROXY: CPT

## 2019-07-11 PROCEDURE — 83970 ASSAY OF PARATHORMONE: CPT

## 2019-07-11 PROCEDURE — 84443 ASSAY THYROID STIM HORMONE: CPT

## 2019-07-11 PROCEDURE — 85025 COMPLETE CBC W/AUTO DIFF WBC: CPT

## 2019-07-11 PROCEDURE — 84439 ASSAY OF FREE THYROXINE: CPT

## 2019-07-11 PROCEDURE — 84165 PROTEIN E-PHORESIS SERUM: CPT

## 2019-07-11 PROCEDURE — 36415 COLL VENOUS BLD VENIPUNCTURE: CPT

## 2019-07-12 LAB — 25(OH)D3 SERPL-MCNC: 64.5 NG/ML (ref 30–100)

## 2019-07-16 LAB
ALBUMIN SERPL ELPH-MCNC: 4.14 G/DL (ref 3.75–5.21)
ALBUMIN/GLOB SERPL: 1.62 {RATIO} (ref 1–2)
ALPHA1 GLOB SERPL ELPH-MCNC: 0.29 G/DL (ref 0.19–0.46)
ALPHA2 GLOB SERPL ELPH-MCNC: 0.75 G/DL (ref 0.48–1.05)
B-GLOBULIN SERPL ELPH-MCNC: 0.7 G/DL (ref 0.68–1.23)
GAMMA GLOB SERPL ELPH-MCNC: 0.82 G/DL (ref 0.62–1.7)
TOTAL PROTEIN (SPECIAL TESTING): 6.7 G/DL (ref 6.5–9.1)

## 2019-08-07 RX ORDER — ESCITALOPRAM OXALATE 10 MG/1
TABLET ORAL
Qty: 30 TABLET | Refills: 0 | Status: SHIPPED | OUTPATIENT
Start: 2019-08-07 | End: 2019-09-02 | Stop reason: SDUPTHER

## 2019-09-02 DIAGNOSIS — F41.9 ANXIETY: Primary | ICD-10-CM

## 2019-09-03 RX ORDER — ESCITALOPRAM OXALATE 10 MG/1
TABLET ORAL
Qty: 30 TABLET | Refills: 0 | Status: SHIPPED | OUTPATIENT
Start: 2019-09-03 | End: 2019-09-19 | Stop reason: SDUPTHER

## 2019-09-10 ENCOUNTER — TELEPHONE (OUTPATIENT)
Dept: SCHEDULING | Age: 69
End: 2019-09-10

## 2019-09-19 ENCOUNTER — OFFICE VISIT (OUTPATIENT)
Dept: FAMILY MEDICINE | Age: 69
End: 2019-09-19

## 2019-09-19 VITALS
SYSTOLIC BLOOD PRESSURE: 116 MMHG | BODY MASS INDEX: 24.64 KG/M2 | DIASTOLIC BLOOD PRESSURE: 70 MMHG | TEMPERATURE: 97.8 F | OXYGEN SATURATION: 97 % | HEIGHT: 67 IN | WEIGHT: 157 LBS | HEART RATE: 60 BPM | RESPIRATION RATE: 16 BRPM

## 2019-09-19 DIAGNOSIS — F41.9 ANXIETY: Primary | ICD-10-CM

## 2019-09-19 DIAGNOSIS — R06.00 DYSPNEA, UNSPECIFIED TYPE: ICD-10-CM

## 2019-09-19 DIAGNOSIS — F17.200 SMOKER: ICD-10-CM

## 2019-09-19 DIAGNOSIS — R91.1 PULMONARY NODULE: ICD-10-CM

## 2019-09-19 PROCEDURE — 99215 OFFICE O/P EST HI 40 MIN: CPT | Performed by: FAMILY MEDICINE

## 2019-09-19 RX ORDER — OMEGA-3 FATTY ACIDS/FISH OIL 300-1000MG
1000 CAPSULE ORAL DAILY
COMMUNITY
End: 2020-11-18 | Stop reason: ALTCHOICE

## 2019-09-19 RX ORDER — ESCITALOPRAM OXALATE 20 MG/1
20 TABLET ORAL DAILY
Qty: 30 TABLET | Refills: 1 | Status: SHIPPED | OUTPATIENT
Start: 2019-09-19 | End: 2019-11-11 | Stop reason: SDUPTHER

## 2019-09-19 SDOH — HEALTH STABILITY: MENTAL HEALTH: HOW OFTEN DO YOU HAVE A DRINK CONTAINING ALCOHOL?: NEVER

## 2019-09-20 ASSESSMENT — ENCOUNTER SYMPTOMS
ALLERGIC/IMMUNOLOGIC NEGATIVE: 1
ENDOCRINE NEGATIVE: 1
EYES NEGATIVE: 1
SHORTNESS OF BREATH: 1
HEMATOLOGIC/LYMPHATIC NEGATIVE: 1
GASTROINTESTINAL NEGATIVE: 1
CONSTITUTIONAL NEGATIVE: 1
NERVOUS/ANXIOUS: 1
NEUROLOGICAL NEGATIVE: 1

## 2019-09-30 DIAGNOSIS — F41.9 ANXIETY: ICD-10-CM

## 2019-09-30 RX ORDER — ESCITALOPRAM OXALATE 10 MG/1
TABLET ORAL
Qty: 30 TABLET | Refills: 0 | OUTPATIENT
Start: 2019-09-30

## 2019-10-25 ENCOUNTER — TELEPHONE (OUTPATIENT)
Dept: SCHEDULING | Age: 69
End: 2019-10-25

## 2019-10-25 ENCOUNTER — OFFICE VISIT (OUTPATIENT)
Dept: FAMILY MEDICINE | Age: 69
End: 2019-10-25

## 2019-10-25 DIAGNOSIS — Z23 FLU VACCINE NEED: Primary | ICD-10-CM

## 2019-10-25 PROCEDURE — 90662 IIV NO PRSV INCREASED AG IM: CPT

## 2019-10-25 PROCEDURE — G0008 ADMIN INFLUENZA VIRUS VAC: HCPCS

## 2019-11-11 DIAGNOSIS — F41.9 ANXIETY: ICD-10-CM

## 2019-11-11 RX ORDER — ESCITALOPRAM OXALATE 20 MG/1
TABLET ORAL
Qty: 30 TABLET | Refills: 0 | Status: SHIPPED | OUTPATIENT
Start: 2019-11-11 | End: 2019-12-11 | Stop reason: SDUPTHER

## 2019-12-11 DIAGNOSIS — F41.9 ANXIETY: ICD-10-CM

## 2019-12-11 RX ORDER — ESCITALOPRAM OXALATE 20 MG/1
TABLET ORAL
Qty: 30 TABLET | Refills: 0 | Status: SHIPPED | OUTPATIENT
Start: 2019-12-11 | End: 2020-01-07 | Stop reason: SDUPTHER

## 2020-01-01 ENCOUNTER — EXTERNAL RECORD (OUTPATIENT)
Dept: HEALTH INFORMATION MANAGEMENT | Facility: OTHER | Age: 70
End: 2020-01-01

## 2020-01-07 DIAGNOSIS — F41.9 ANXIETY: ICD-10-CM

## 2020-01-07 RX ORDER — ESCITALOPRAM OXALATE 20 MG/1
TABLET ORAL
Qty: 90 TABLET | Refills: 1 | Status: SHIPPED | OUTPATIENT
Start: 2020-01-07 | End: 2020-06-30

## 2020-02-09 VITALS
BODY MASS INDEX: 23.59 KG/M2 | WEIGHT: 155.64 LBS | DIASTOLIC BLOOD PRESSURE: 75 MMHG | HEIGHT: 68 IN | HEART RATE: 80 BPM | RESPIRATION RATE: 16 BRPM | SYSTOLIC BLOOD PRESSURE: 113 MMHG

## 2020-02-09 VITALS
DIASTOLIC BLOOD PRESSURE: 66 MMHG | WEIGHT: 148.04 LBS | HEIGHT: 68 IN | RESPIRATION RATE: 16 BRPM | BODY MASS INDEX: 22.44 KG/M2 | SYSTOLIC BLOOD PRESSURE: 107 MMHG | HEART RATE: 67 BPM

## 2020-02-09 VITALS
DIASTOLIC BLOOD PRESSURE: 74 MMHG | RESPIRATION RATE: 16 BRPM | BODY MASS INDEX: 23.86 KG/M2 | HEIGHT: 68 IN | SYSTOLIC BLOOD PRESSURE: 113 MMHG | WEIGHT: 157.41 LBS | HEART RATE: 66 BPM

## 2020-02-19 ENCOUNTER — APPOINTMENT (OUTPATIENT)
Dept: RADIATION ONCOLOGY | Age: 70
End: 2020-02-19
Attending: RADIOLOGY

## 2020-02-24 ENCOUNTER — APPOINTMENT (OUTPATIENT)
Dept: MAMMOGRAPHY | Age: 70
End: 2020-02-24
Attending: RADIOLOGY

## 2020-03-04 ENCOUNTER — HOSPITAL ENCOUNTER (OUTPATIENT)
Dept: MAMMOGRAPHY | Age: 70
Discharge: HOME | End: 2020-03-04
Attending: RADIOLOGY

## 2020-03-04 ENCOUNTER — APPOINTMENT (OUTPATIENT)
Dept: RADIATION ONCOLOGY | Age: 70
End: 2020-03-04

## 2020-03-04 DIAGNOSIS — D05.12 INTRADUCTAL CARCINOMA IN SITU OF LEFT BREAST: ICD-10-CM

## 2020-03-04 PROCEDURE — G0279 TOMOSYNTHESIS, MAMMO: HCPCS

## 2020-03-05 DIAGNOSIS — D05.12 INTRADUCTAL CARCINOMA IN SITU OF LEFT BREAST: Primary | ICD-10-CM

## 2020-03-19 ENCOUNTER — APPOINTMENT (OUTPATIENT)
Dept: RADIATION ONCOLOGY | Age: 70
End: 2020-03-19

## 2020-04-02 ENCOUNTER — HOSPITAL ENCOUNTER (OUTPATIENT)
Dept: MAMMOGRAPHY | Age: 70
End: 2020-04-02
Attending: RADIOLOGY

## 2020-04-02 ENCOUNTER — APPOINTMENT (OUTPATIENT)
Dept: MAMMOGRAPHY | Age: 70
End: 2020-04-02
Attending: RADIOLOGY

## 2020-04-02 ENCOUNTER — HOSPITAL ENCOUNTER (OUTPATIENT)
Dept: MAMMOGRAPHY | Age: 70
Discharge: HOME | End: 2020-04-02
Attending: RADIOLOGY

## 2020-04-02 DIAGNOSIS — R92.8 ABNORMAL MAMMOGRAM OF RIGHT BREAST: ICD-10-CM

## 2020-04-02 DIAGNOSIS — D05.12 INTRADUCTAL CARCINOMA IN SITU OF LEFT BREAST: ICD-10-CM

## 2020-04-02 PROCEDURE — G0279 TOMOSYNTHESIS, MAMMO: HCPCS

## 2020-04-15 ENCOUNTER — APPOINTMENT (OUTPATIENT)
Dept: RADIATION ONCOLOGY | Age: 70
End: 2020-04-15

## 2020-04-24 ENCOUNTER — TELEPHONE (OUTPATIENT)
Dept: CARDIOLOGY | Age: 70
End: 2020-04-24

## 2020-05-06 ENCOUNTER — APPOINTMENT (OUTPATIENT)
Dept: CARDIOLOGY | Age: 70
End: 2020-05-06

## 2020-05-27 RX ORDER — FLECAINIDE ACETATE 50 MG/1
50 TABLET ORAL 2 TIMES DAILY
Qty: 180 TABLET | Refills: 0 | Status: SHIPPED | OUTPATIENT
Start: 2020-05-27 | End: 2020-09-17

## 2020-06-02 ENCOUNTER — TELEPHONE (OUTPATIENT)
Dept: RADIATION ONCOLOGY | Age: 70
End: 2020-06-02

## 2020-06-04 ENCOUNTER — HOSPITAL ENCOUNTER (OUTPATIENT)
Dept: RADIATION ONCOLOGY | Age: 70
Discharge: HOME | End: 2020-06-04

## 2020-06-04 DIAGNOSIS — D05.12 INTRADUCTAL CARCINOMA IN SITU OF LEFT BREAST: Primary | ICD-10-CM

## 2020-06-17 ENCOUNTER — TELEPHONE (OUTPATIENT)
Dept: CARDIOLOGY | Age: 70
End: 2020-06-17

## 2020-06-24 ENCOUNTER — OFFICE VISIT (OUTPATIENT)
Dept: CARDIOLOGY | Age: 70
End: 2020-06-24

## 2020-06-24 VITALS
HEIGHT: 66 IN | BODY MASS INDEX: 23.78 KG/M2 | DIASTOLIC BLOOD PRESSURE: 62 MMHG | SYSTOLIC BLOOD PRESSURE: 102 MMHG | HEART RATE: 72 BPM | WEIGHT: 148 LBS | RESPIRATION RATE: 12 BRPM

## 2020-06-24 DIAGNOSIS — I48.91 ATRIAL FIBRILLATION, UNSPECIFIED TYPE (CMD): Primary | ICD-10-CM

## 2020-06-24 PROCEDURE — 99215 OFFICE O/P EST HI 40 MIN: CPT | Performed by: INTERNAL MEDICINE

## 2020-06-24 PROCEDURE — 93000 ELECTROCARDIOGRAM COMPLETE: CPT | Performed by: INTERNAL MEDICINE

## 2020-06-24 SDOH — HEALTH STABILITY: PHYSICAL HEALTH: ON AVERAGE, HOW MANY DAYS PER WEEK DO YOU ENGAGE IN MODERATE TO STRENUOUS EXERCISE (LIKE A BRISK WALK)?: 0 DAYS

## 2020-06-24 SDOH — HEALTH STABILITY: PHYSICAL HEALTH: ON AVERAGE, HOW MANY MINUTES DO YOU ENGAGE IN EXERCISE AT THIS LEVEL?: 0 MIN

## 2020-06-24 ASSESSMENT — PATIENT HEALTH QUESTIONNAIRE - PHQ9
2. FEELING DOWN, DEPRESSED OR HOPELESS: NOT AT ALL
SUM OF ALL RESPONSES TO PHQ9 QUESTIONS 1 AND 2: 0
1. LITTLE INTEREST OR PLEASURE IN DOING THINGS: NOT AT ALL
CLINICAL INTERPRETATION OF PHQ2 SCORE: NO FURTHER SCREENING NEEDED
SUM OF ALL RESPONSES TO PHQ9 QUESTIONS 1 AND 2: 0
CLINICAL INTERPRETATION OF PHQ9 SCORE: NO FURTHER SCREENING NEEDED

## 2020-06-30 DIAGNOSIS — F41.9 ANXIETY: ICD-10-CM

## 2020-06-30 RX ORDER — ESCITALOPRAM OXALATE 20 MG/1
20 TABLET ORAL DAILY
Qty: 90 TABLET | Refills: 0 | Status: SHIPPED | OUTPATIENT
Start: 2020-06-30 | End: 2020-11-03

## 2020-09-17 RX ORDER — FLECAINIDE ACETATE 50 MG/1
TABLET ORAL
Qty: 180 TABLET | Refills: 1 | Status: SHIPPED | OUTPATIENT
Start: 2020-09-17 | End: 2021-03-09

## 2020-10-14 ENCOUNTER — TELEPHONE (OUTPATIENT)
Dept: SCHEDULING | Age: 70
End: 2020-10-14

## 2020-11-01 DIAGNOSIS — F41.9 ANXIETY: ICD-10-CM

## 2020-11-03 RX ORDER — ESCITALOPRAM OXALATE 20 MG/1
TABLET ORAL
Qty: 30 TABLET | Refills: 0 | Status: SHIPPED | OUTPATIENT
Start: 2020-11-03 | End: 2020-11-18 | Stop reason: SDUPTHER

## 2020-11-13 ENCOUNTER — HOSPITAL ENCOUNTER (OUTPATIENT)
Dept: MAMMOGRAPHY | Age: 70
Discharge: HOME | End: 2020-11-13
Attending: RADIOLOGY

## 2020-11-13 DIAGNOSIS — D05.12 INTRADUCTAL CARCINOMA IN SITU OF LEFT BREAST: ICD-10-CM

## 2020-11-13 PROCEDURE — G0279 TOMOSYNTHESIS, MAMMO: HCPCS

## 2020-11-18 ENCOUNTER — OFFICE VISIT (OUTPATIENT)
Dept: FAMILY MEDICINE | Age: 70
End: 2020-11-18

## 2020-11-18 DIAGNOSIS — R74.8 ABNORMAL ALKALINE PHOSPHATASE TEST: ICD-10-CM

## 2020-11-18 DIAGNOSIS — R91.1 LUNG NODULE: ICD-10-CM

## 2020-11-18 DIAGNOSIS — R63.4 WEIGHT LOSS: ICD-10-CM

## 2020-11-18 DIAGNOSIS — R39.89 URINARY PROBLEM: ICD-10-CM

## 2020-11-18 DIAGNOSIS — D05.12 INTRADUCTAL CARCINOMA IN SITU OF LEFT BREAST: ICD-10-CM

## 2020-11-18 DIAGNOSIS — R73.9 HYPERGLYCEMIA: ICD-10-CM

## 2020-11-18 DIAGNOSIS — F41.9 ANXIETY: Primary | ICD-10-CM

## 2020-11-18 DIAGNOSIS — F17.200 SMOKER: ICD-10-CM

## 2020-11-18 DIAGNOSIS — Z23 NEED FOR VACCINATION: ICD-10-CM

## 2020-11-18 PROCEDURE — G0008 ADMIN INFLUENZA VIRUS VAC: HCPCS

## 2020-11-18 PROCEDURE — 90662 IIV NO PRSV INCREASED AG IM: CPT

## 2020-11-18 PROCEDURE — 36415 COLL VENOUS BLD VENIPUNCTURE: CPT

## 2020-11-18 PROCEDURE — 99214 OFFICE O/P EST MOD 30 MIN: CPT | Performed by: FAMILY MEDICINE

## 2020-11-18 RX ORDER — TERIPARATIDE 250 UG/ML
INJECTION, SOLUTION SUBCUTANEOUS DAILY
COMMUNITY
Start: 2020-10-20 | End: 2022-06-21 | Stop reason: ALTCHOICE

## 2020-11-18 RX ORDER — ESCITALOPRAM OXALATE 20 MG/1
20 TABLET ORAL DAILY
Qty: 90 TABLET | Refills: 1 | Status: SHIPPED | OUTPATIENT
Start: 2020-11-18 | End: 2021-05-19

## 2020-11-18 RX ORDER — PEN NEEDLE, DIABETIC 32GX 5/32"
NEEDLE, DISPOSABLE MISCELLANEOUS DAILY
COMMUNITY
Start: 2020-09-13 | End: 2022-06-21 | Stop reason: ALTCHOICE

## 2020-11-18 SDOH — HEALTH STABILITY: MENTAL HEALTH: HOW OFTEN DO YOU HAVE A DRINK CONTAINING ALCOHOL?: NEVER

## 2020-11-18 ASSESSMENT — ENCOUNTER SYMPTOMS
PSYCHIATRIC NEGATIVE: 1
EYES NEGATIVE: 1
GASTROINTESTINAL NEGATIVE: 1
NEUROLOGICAL NEGATIVE: 1
ALLERGIC/IMMUNOLOGIC NEGATIVE: 1
HEMATOLOGIC/LYMPHATIC NEGATIVE: 1
CONSTITUTIONAL NEGATIVE: 1
RESPIRATORY NEGATIVE: 1
ENDOCRINE NEGATIVE: 1

## 2020-11-18 ASSESSMENT — PATIENT HEALTH QUESTIONNAIRE - PHQ9
CLINICAL INTERPRETATION OF PHQ2 SCORE: NO FURTHER SCREENING NEEDED
CLINICAL INTERPRETATION OF PHQ9 SCORE: NO FURTHER SCREENING NEEDED
2. FEELING DOWN, DEPRESSED OR HOPELESS: SEVERAL DAYS
SUM OF ALL RESPONSES TO PHQ9 QUESTIONS 1 AND 2: 1
1. LITTLE INTEREST OR PLEASURE IN DOING THINGS: NOT AT ALL
SUM OF ALL RESPONSES TO PHQ9 QUESTIONS 1 AND 2: 1

## 2020-11-18 ASSESSMENT — PAIN SCALES - GENERAL: PAINLEVEL: 0

## 2020-11-19 ENCOUNTER — TELEPHONE (OUTPATIENT)
Dept: FAMILY MEDICINE | Age: 70
End: 2020-11-19

## 2020-11-19 ENCOUNTER — HOSPITAL ENCOUNTER (OUTPATIENT)
Dept: RADIATION ONCOLOGY | Age: 70
Discharge: HOME | End: 2020-11-19

## 2020-11-19 ENCOUNTER — TELEPHONE (OUTPATIENT)
Dept: SCHEDULING | Age: 70
End: 2020-11-19

## 2020-11-19 LAB
ALBUMIN SERPL-MCNC: 3.7 G/DL (ref 3.6–5.1)
ALBUMIN/GLOB SERPL: 1.3 {RATIO} (ref 1–2.4)
ALP SERPL-CCNC: 131 UNITS/L (ref 45–117)
ALT SERPL-CCNC: 17 UNITS/L
ANION GAP SERPL CALC-SCNC: 13 MMOL/L (ref 10–20)
AST SERPL-CCNC: 16 UNITS/L
BASOPHILS # BLD: 0 K/MCL (ref 0–0.3)
BASOPHILS NFR BLD: 0 %
BILIRUB SERPL-MCNC: 0.5 MG/DL (ref 0.2–1)
BUN SERPL-MCNC: 18 MG/DL (ref 6–20)
BUN/CREAT SERPL: 25 (ref 7–25)
CALCIUM SERPL-MCNC: 9.7 MG/DL (ref 8.4–10.2)
CHLORIDE SERPL-SCNC: 107 MMOL/L (ref 98–107)
CO2 SERPL-SCNC: 25 MMOL/L (ref 21–32)
CREAT SERPL-MCNC: 0.72 MG/DL (ref 0.51–0.95)
DIFFERENTIAL METHOD BLD: NORMAL
EOSINOPHIL # BLD: 0.1 K/MCL (ref 0.1–0.5)
EOSINOPHIL NFR BLD: 1 %
ERYTHROCYTE [DISTWIDTH] IN BLOOD: 13.2 % (ref 11–15)
GLOBULIN SER-MCNC: 2.9 G/DL (ref 2–4)
GLUCOSE SERPL-MCNC: 130 MG/DL (ref 65–99)
HBA1C MFR BLD: 5.6 % (ref 4.5–5.6)
HCT VFR BLD CALC: 41.8 % (ref 36–46.5)
HGB BLD-MCNC: 13.9 G/DL (ref 12–15.5)
IMM GRANULOCYTES # BLD AUTO: 0 K/MCL (ref 0–0.2)
IMM GRANULOCYTES NFR BLD: 0 %
LENGTH OF FAST TIME PATIENT: ABNORMAL HRS
LYMPHOCYTES # BLD: 1.9 K/MCL (ref 1–4)
LYMPHOCYTES NFR BLD: 26 %
MCH RBC QN AUTO: 32.9 PG (ref 26–34)
MCHC RBC AUTO-ENTMCNC: 33.3 G/DL (ref 32–36.5)
MCV RBC AUTO: 98.8 FL (ref 78–100)
MONOCYTES # BLD: 0.6 K/MCL (ref 0.3–0.9)
MONOCYTES NFR BLD: 8 %
NEUTROPHILS # BLD: 4.7 K/MCL (ref 1.8–7.7)
NEUTROPHILS NFR BLD: 65 %
NRBC BLD MANUAL-RTO: 0 /100 WBC
PLATELET # BLD: 217 K/MCL (ref 140–450)
POTASSIUM SERPL-SCNC: 3.7 MMOL/L (ref 3.4–5.1)
PROT SERPL-MCNC: 6.6 G/DL (ref 6.4–8.2)
RBC # BLD: 4.23 MIL/MCL (ref 4–5.2)
SODIUM SERPL-SCNC: 141 MMOL/L (ref 135–145)
TSH SERPL-ACNC: 3.11 MCUNITS/ML (ref 0.35–5)
WBC # BLD: 7.3 K/MCL (ref 4.2–11)

## 2020-11-19 PROCEDURE — 99211 OFF/OP EST MAY X REQ PHY/QHP: CPT

## 2020-11-19 ASSESSMENT — ENCOUNTER SYMPTOMS
EYES NEGATIVE: 1
RESPIRATORY NEGATIVE: 1
HEMATOLOGIC/LYMPHATIC NEGATIVE: 1
ENDOCRINE NEGATIVE: 1
GASTROINTESTINAL NEGATIVE: 1
NEUROLOGICAL NEGATIVE: 1
PSYCHIATRIC NEGATIVE: 1
CONSTITUTIONAL NEGATIVE: 1

## 2020-11-20 ENCOUNTER — TELEPHONE (OUTPATIENT)
Dept: FAMILY MEDICINE | Age: 70
End: 2020-11-20

## 2020-11-20 ENCOUNTER — TELEPHONE (OUTPATIENT)
Dept: INTERNAL MEDICINE | Age: 70
End: 2020-11-20

## 2020-11-21 ENCOUNTER — HOSPITAL ENCOUNTER (EMERGENCY)
Facility: HOSPITAL | Age: 70
Discharge: HOME | End: 2020-11-21
Payer: MEDICARE

## 2020-11-21 VITALS
OXYGEN SATURATION: 97 % | RESPIRATION RATE: 18 BRPM | SYSTOLIC BLOOD PRESSURE: 142 MMHG | DIASTOLIC BLOOD PRESSURE: 84 MMHG | TEMPERATURE: 98 F | HEART RATE: 54 BPM

## 2020-11-21 DIAGNOSIS — S61.451A DOG BITE OF RIGHT HAND, INITIAL ENCOUNTER: Primary | ICD-10-CM
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
## 2020-11-21 PROCEDURE — 90471 IMMUNIZATION ADMIN: CPT

## 2020-11-21 PROCEDURE — 99283 EMERGENCY DEPT VISIT LOW MDM: CPT

## 2020-11-21 PROCEDURE — 12001 RPR S/N/AX/GEN/TRNK 2.5CM/<: CPT

## 2020-11-21 RX ORDER — DOXYCYCLINE HYCLATE 100 MG/1
100 CAPSULE ORAL 2 TIMES DAILY
Qty: 10 CAPSULE | Refills: 0 | Status: SHIPPED | OUTPATIENT
Start: 2020-11-21 | End: 2020-11-26

## 2020-11-21 RX ORDER — METRONIDAZOLE 500 MG/1
500 TABLET ORAL 2 TIMES DAILY
Qty: 10 TABLET | Refills: 0 | Status: SHIPPED | OUTPATIENT
Start: 2020-11-21 | End: 2020-11-26

## 2020-11-21 NOTE — ED NOTES
Laceration to R thumb and puncture to back of R hand. Patient was bit by dog. States dog is up to date on vaccinations. Patient unsure of last tetanus.

## 2020-11-21 NOTE — ED PROVIDER NOTES
Patient Seen in: [facility] AND [facility] Emergency Department      History   Patient presents with:  Bite    Stated Complaint: dog bite rt hand    HPI    64-year-old female presents to the emergency department with a dog bite wound of the right hand.   She stat Capillary Refill: Capillary refill takes less than 2 seconds. Neurological:      General: No focal deficit present. Mental Status: She is alert. Cranial Nerves: No cranial nerve deficit. Sensory: No sensory deficit.                [facility]   [phone]    Schedule an appointment as soon as possible for a visit in 2 days  For wound re-check    We recommend that you schedule follow up care with a primary care provider within the next three

## 2020-11-30 ENCOUNTER — TELEPHONE (OUTPATIENT)
Dept: SCHEDULING | Age: 70
End: 2020-11-30

## 2020-11-30 ENCOUNTER — TELEPHONE (OUTPATIENT)
Dept: INTERNAL MEDICINE | Age: 70
End: 2020-11-30

## 2020-12-03 ENCOUNTER — HOSPITAL ENCOUNTER (OUTPATIENT)
Dept: BONE DENSITY | Age: 70
Discharge: HOME | End: 2020-12-03
Attending: INTERNAL MEDICINE
Payer: MEDICARE

## 2020-12-03 ENCOUNTER — LAB ENCOUNTER (OUTPATIENT)
Dept: LAB | Age: 70
End: 2020-12-03
Attending: INTERNAL MEDICINE
Payer: MEDICARE

## 2020-12-03 DIAGNOSIS — Z13.820 SCREENING FOR OSTEOPOROSIS: ICD-10-CM

## 2020-12-03 DIAGNOSIS — M89.8X9 BONE LOSS: Primary | ICD-10-CM

## 2020-12-03 DIAGNOSIS — M81.0 AGE-RELATED OSTEOPOROSIS WITHOUT CURRENT PATHOLOGICAL FRACTURE: ICD-10-CM

## 2020-12-03 PROCEDURE — 80053 COMPREHEN METABOLIC PANEL: CPT

## 2020-12-03 PROCEDURE — 85025 COMPLETE CBC W/AUTO DIFF WBC: CPT

## 2020-12-03 PROCEDURE — 77080 DXA BONE DENSITY AXIAL: CPT | Performed by: INTERNAL MEDICINE

## 2020-12-03 PROCEDURE — 82306 VITAMIN D 25 HYDROXY: CPT

## 2020-12-03 PROCEDURE — 36415 COLL VENOUS BLD VENIPUNCTURE: CPT

## 2020-12-07 ENCOUNTER — EXTERNAL RECORD (OUTPATIENT)
Dept: INTERNAL MEDICINE | Age: 70
End: 2020-12-07

## 2020-12-10 ENCOUNTER — LAB SERVICES (OUTPATIENT)
Dept: FAMILY MEDICINE | Age: 70
End: 2020-12-10

## 2020-12-10 DIAGNOSIS — R74.8 ABNORMAL ALKALINE PHOSPHATASE TEST: ICD-10-CM

## 2020-12-10 PROCEDURE — 84075 ASSAY ALKALINE PHOSPHATASE: CPT | Performed by: CLINICAL MEDICAL LABORATORY

## 2020-12-10 PROCEDURE — 36415 COLL VENOUS BLD VENIPUNCTURE: CPT

## 2020-12-10 PROCEDURE — 84080 ASSAY ALKALINE PHOSPHATASES: CPT | Performed by: CLINICAL MEDICAL LABORATORY

## 2020-12-15 LAB
ALP BONE CFR SERPL: 67.3 % (ref 10.7–68.3)
ALP INTEST CFR SERPL: 0 % (ref 0–24.2)
ALP LIVER CFR SERPL: 32.7 % (ref 26–86.2)
ALP SERPL-CCNC: 120 U/L (ref 34–123)
BONE FRACTION: 80.8 U/L (ref 12.9–52.6)
INTESTINE FRACTION: 0 U/L (ref 0–16.3)
LIVER FRACTION: 39.2 U/L (ref 16–69.3)

## 2020-12-23 ENCOUNTER — TELEPHONE (OUTPATIENT)
Dept: SCHEDULING | Age: 70
End: 2020-12-23

## 2020-12-23 ENCOUNTER — TELEPHONE (OUTPATIENT)
Dept: FAMILY MEDICINE | Age: 70
End: 2020-12-23

## 2021-01-01 ENCOUNTER — EXTERNAL RECORD (OUTPATIENT)
Dept: HEALTH INFORMATION MANAGEMENT | Facility: OTHER | Age: 71
End: 2021-01-01

## 2021-01-14 ENCOUNTER — HOSPITAL ENCOUNTER (OUTPATIENT)
Dept: NUCLEAR MEDICINE | Age: 71
Discharge: HOME | End: 2021-01-14
Attending: FAMILY MEDICINE

## 2021-01-14 ENCOUNTER — TELEPHONE (OUTPATIENT)
Dept: FAMILY MEDICINE | Age: 71
End: 2021-01-14

## 2021-01-14 ENCOUNTER — HOSPITAL ENCOUNTER (OUTPATIENT)
Dept: CT IMAGING | Age: 71
Discharge: HOME | End: 2021-01-14
Attending: FAMILY MEDICINE

## 2021-01-14 DIAGNOSIS — R91.1 LUNG NODULE: ICD-10-CM

## 2021-01-14 DIAGNOSIS — F17.200 SMOKER: ICD-10-CM

## 2021-01-14 DIAGNOSIS — R63.4 WEIGHT LOSS: ICD-10-CM

## 2021-01-14 DIAGNOSIS — R74.8 ABNORMAL ALKALINE PHOSPHATASE TEST: ICD-10-CM

## 2021-01-14 PROCEDURE — 78306 BONE IMAGING WHOLE BODY: CPT

## 2021-01-14 PROCEDURE — A9503 TC99M MEDRONATE: HCPCS | Performed by: FAMILY MEDICINE

## 2021-01-14 PROCEDURE — 71250 CT THORAX DX C-: CPT

## 2021-01-14 PROCEDURE — 10006150 HB RX 343: Performed by: FAMILY MEDICINE

## 2021-01-14 RX ORDER — TC 99M MEDRONATE 20 MG/10ML
25 INJECTION, POWDER, LYOPHILIZED, FOR SOLUTION INTRAVENOUS ONCE
Status: COMPLETED | OUTPATIENT
Start: 2021-01-14 | End: 2021-01-14

## 2021-01-14 RX ADMIN — TC 99M MEDRONATE 25 MILLICURIE: 20 INJECTION, POWDER, LYOPHILIZED, FOR SOLUTION INTRAVENOUS at 13:00

## 2021-01-18 ENCOUNTER — TELEPHONE (OUTPATIENT)
Dept: FAMILY MEDICINE | Age: 71
End: 2021-01-18

## 2021-03-09 RX ORDER — FLECAINIDE ACETATE 50 MG/1
TABLET ORAL
Qty: 180 TABLET | Refills: 0 | Status: SHIPPED | OUTPATIENT
Start: 2021-03-09 | End: 2021-06-04

## 2021-04-21 ENCOUNTER — IMMUNIZATION (OUTPATIENT)
Dept: LAB | Age: 71
End: 2021-04-21

## 2021-04-21 DIAGNOSIS — Z23 NEED FOR VACCINATION: Primary | ICD-10-CM

## 2021-04-21 PROCEDURE — 91300 COVID 19 PFIZER-BIONTECH: CPT

## 2021-04-21 PROCEDURE — 0001A COVID 19 PFIZER-BIONTECH: CPT

## 2021-05-12 ENCOUNTER — IMMUNIZATION (OUTPATIENT)
Dept: LAB | Age: 71
End: 2021-05-12
Attending: NURSE PRACTITIONER

## 2021-05-12 DIAGNOSIS — Z23 NEED FOR VACCINATION: Primary | ICD-10-CM

## 2021-05-12 PROCEDURE — 0002A COVID 19 PFIZER-BIONTECH: CPT | Performed by: NURSE PRACTITIONER

## 2021-05-12 PROCEDURE — 91300 COVID 19 PFIZER-BIONTECH: CPT | Performed by: NURSE PRACTITIONER

## 2021-05-19 DIAGNOSIS — F41.9 ANXIETY: ICD-10-CM

## 2021-05-19 RX ORDER — ESCITALOPRAM OXALATE 20 MG/1
20 TABLET ORAL DAILY
Qty: 90 TABLET | Refills: 0 | Status: SHIPPED | OUTPATIENT
Start: 2021-05-19 | End: 2021-08-24

## 2021-05-26 VITALS
HEART RATE: 61 BPM | BODY MASS INDEX: 21.21 KG/M2 | DIASTOLIC BLOOD PRESSURE: 66 MMHG | SYSTOLIC BLOOD PRESSURE: 137 MMHG | BODY MASS INDEX: 22.91 KG/M2 | OXYGEN SATURATION: 97 % | HEIGHT: 67 IN | SYSTOLIC BLOOD PRESSURE: 104 MMHG | DIASTOLIC BLOOD PRESSURE: 80 MMHG | HEIGHT: 66 IN | WEIGHT: 132 LBS | TEMPERATURE: 97.4 F | WEIGHT: 146 LBS | HEART RATE: 60 BPM | RESPIRATION RATE: 16 BRPM | TEMPERATURE: 97.6 F | OXYGEN SATURATION: 98 %

## 2021-06-04 RX ORDER — FLECAINIDE ACETATE 50 MG/1
TABLET ORAL
Qty: 180 TABLET | Refills: 0 | Status: SHIPPED | OUTPATIENT
Start: 2021-06-04 | End: 2021-08-24

## 2021-06-10 ENCOUNTER — APPOINTMENT (OUTPATIENT)
Dept: CARDIOLOGY | Age: 71
End: 2021-06-10
Attending: INTERNAL MEDICINE

## 2021-06-16 ENCOUNTER — APPOINTMENT (OUTPATIENT)
Dept: CARDIOLOGY | Age: 71
End: 2021-06-16

## 2021-06-22 ENCOUNTER — TELEPHONE (OUTPATIENT)
Dept: SCHEDULING | Age: 71
End: 2021-06-22

## 2021-06-23 ENCOUNTER — OFFICE VISIT (OUTPATIENT)
Dept: FAMILY MEDICINE | Age: 71
End: 2021-06-23

## 2021-06-23 VITALS
RESPIRATION RATE: 16 BRPM | BODY MASS INDEX: 21.16 KG/M2 | SYSTOLIC BLOOD PRESSURE: 110 MMHG | HEIGHT: 67 IN | WEIGHT: 134.8 LBS | TEMPERATURE: 97.9 F | OXYGEN SATURATION: 97 % | DIASTOLIC BLOOD PRESSURE: 68 MMHG | HEART RATE: 65 BPM

## 2021-06-23 DIAGNOSIS — R10.11 RIGHT UPPER QUADRANT ABDOMINAL PAIN: Primary | ICD-10-CM

## 2021-06-23 LAB
APPEARANCE, POC: NORMAL
BILIRUBIN, POC: NEGATIVE
COLOR, POC: YELLOW
GLUCOSE UR-MCNC: NEGATIVE MG/DL
KETONES, POC: NEGATIVE MG/DL
NITRITE, POC: NEGATIVE
OCCULT BLOOD, POC: NEGATIVE
PH UR: 6.5 [PH] (ref 5–7)
PROT UR-MCNC: NEGATIVE MG/DL
SP GR UR: 1.02 (ref 1–1.03)
UROBILINOGEN UR-MCNC: 0.2 MG/DL (ref 0–1)
WBC (LEUKOCYTE) ESTERASE, POC: NEGATIVE

## 2021-06-23 PROCEDURE — 96127 BRIEF EMOTIONAL/BEHAV ASSMT: CPT | Performed by: NURSE PRACTITIONER

## 2021-06-23 PROCEDURE — 99213 OFFICE O/P EST LOW 20 MIN: CPT | Performed by: NURSE PRACTITIONER

## 2021-06-23 PROCEDURE — 81003 URINALYSIS AUTO W/O SCOPE: CPT | Performed by: NURSE PRACTITIONER

## 2021-06-23 RX ORDER — HYDROCHLOROTHIAZIDE 12.5 MG/1
12.5 CAPSULE, GELATIN COATED ORAL DAILY
COMMUNITY
Start: 2021-06-04 | End: 2023-04-24

## 2021-06-23 ASSESSMENT — ENCOUNTER SYMPTOMS
WHEEZING: 0
FATIGUE: 1
CONSTIPATION: 0
HEADACHES: 0
DIZZINESS: 0
VOMITING: 0
FEVER: 0
COUGH: 0
NAUSEA: 0
DIARRHEA: 0
LIGHT-HEADEDNESS: 0
BLOOD IN STOOL: 0
WEAKNESS: 0
SHORTNESS OF BREATH: 0
ABDOMINAL PAIN: 1

## 2021-06-23 ASSESSMENT — PATIENT HEALTH QUESTIONNAIRE - PHQ9
SUM OF ALL RESPONSES TO PHQ9 QUESTIONS 1 AND 2: 3
CLINICAL INTERPRETATION OF PHQ2 SCORE: MILD DEPRESSION
4. FEELING TIRED OR HAVING LITTLE ENERGY: SEVERAL DAYS
SUM OF ALL RESPONSES TO PHQ9 QUESTIONS 1 TO 9: 7
8. MOVING OR SPEAKING SO SLOWLY THAT OTHER PEOPLE COULD HAVE NOTICED. OR THE OPPOSITE, BEING SO FIGETY OR RESTLESS THAT YOU HAVE BEEN MOVING AROUND A LOT MORE THAN USUAL: NOT AT ALL
1. LITTLE INTEREST OR PLEASURE IN DOING THINGS: SEVERAL DAYS
SUM OF ALL RESPONSES TO PHQ QUESTIONS 1-9: 7
3. TROUBLE FALLING OR STAYING ASLEEP OR SLEEPING TOO MUCH: NOT AT ALL
CLINICAL INTERPRETATION OF PHQ9 SCORE: MILD DEPRESSION
SUM OF ALL RESPONSES TO PHQ9 QUESTIONS 1 AND 2: 3
9. THOUGHTS THAT YOU WOULD BE BETTER OFF DEAD, OR OF HURTING YOURSELF: NOT AT ALL
10. IF YOU CHECKED OFF ANY PROBLEMS, HOW DIFFICULT HAVE THESE PROBLEMS MADE IT FOR YOU TO DO YOUR WORK, TAKE CARE OF THINGS AT HOME, OR GET ALONG WITH OTHER PEOPLE: SOMEWHAT DIFFICULT
2. FEELING DOWN, DEPRESSED OR HOPELESS: MORE THAN HALF THE DAYS
6. FEELING BAD ABOUT YOURSELF - OR THAT YOU ARE A FAILURE OR HAVE LET YOURSELF OR YOUR FAMILY DOWN: SEVERAL DAYS
CLINICAL INTERPRETATION OF PHQ9 SCORE: FURTHER SCREENING NEEDED
5. POOR APPETITE, WEIGHT LOSS, OR OVEREATING: NOT AT ALL
CLINICAL INTERPRETATION OF PHQ2 SCORE: FURTHER SCREENING NEEDED
7. TROUBLE CONCENTRATING ON THINGS, SUCH AS READING THE NEWSPAPER OR WATCHING TELEVISION: MORE THAN HALF THE DAYS

## 2021-06-28 ENCOUNTER — TELEPHONE (OUTPATIENT)
Dept: SCHEDULING | Age: 71
End: 2021-06-28

## 2021-06-28 DIAGNOSIS — R10.11 RIGHT UPPER QUADRANT ABDOMINAL PAIN: Primary | ICD-10-CM

## 2021-06-29 ENCOUNTER — TELEPHONE (OUTPATIENT)
Dept: FAMILY MEDICINE | Age: 71
End: 2021-06-29

## 2021-06-30 ENCOUNTER — HOSPITAL ENCOUNTER (OUTPATIENT)
Dept: ULTRASOUND IMAGING | Age: 71
Discharge: HOME | End: 2021-06-30
Attending: NURSE PRACTITIONER

## 2021-06-30 DIAGNOSIS — R10.11 RIGHT UPPER QUADRANT ABDOMINAL PAIN: ICD-10-CM

## 2021-06-30 PROCEDURE — 76705 ECHO EXAM OF ABDOMEN: CPT

## 2021-07-02 ENCOUNTER — TELEPHONE (OUTPATIENT)
Dept: URGENT CARE | Age: 71
End: 2021-07-02

## 2021-07-02 DIAGNOSIS — R93.5 ABNORMAL US (ULTRASOUND) OF ABDOMEN: Primary | ICD-10-CM

## 2021-07-02 DIAGNOSIS — R74.8 ELEVATED ALKALINE PHOSPHATASE LEVEL: ICD-10-CM

## 2021-07-05 ENCOUNTER — LAB SERVICES (OUTPATIENT)
Dept: LAB | Age: 71
End: 2021-07-05

## 2021-07-05 DIAGNOSIS — R74.8 ELEVATED ALKALINE PHOSPHATASE LEVEL: ICD-10-CM

## 2021-07-05 DIAGNOSIS — R93.5 ABNORMAL US (ULTRASOUND) OF ABDOMEN: ICD-10-CM

## 2021-07-05 LAB
ALBUMIN SERPL-MCNC: 3.4 G/DL (ref 3.6–5.1)
ALP SERPL-CCNC: 90 UNITS/L (ref 45–117)
ALT SERPL-CCNC: 21 UNITS/L
AST SERPL-CCNC: 13 UNITS/L
BILIRUB CONJ SERPL-MCNC: <0.1 MG/DL (ref 0–0.2)
BILIRUB SERPL-MCNC: 0.5 MG/DL (ref 0.2–1)
HBV SURFACE AG SER QL: NEGATIVE
HCV AB SER QL: NEGATIVE
PROT SERPL-MCNC: 6.6 G/DL (ref 6.4–8.2)

## 2021-07-05 PROCEDURE — 86803 HEPATITIS C AB TEST: CPT | Performed by: CLINICAL MEDICAL LABORATORY

## 2021-07-05 PROCEDURE — 80076 HEPATIC FUNCTION PANEL: CPT | Performed by: CLINICAL MEDICAL LABORATORY

## 2021-07-05 PROCEDURE — 84075 ASSAY ALKALINE PHOSPHATASE: CPT | Performed by: CLINICAL MEDICAL LABORATORY

## 2021-07-05 PROCEDURE — 87340 HEPATITIS B SURFACE AG IA: CPT | Performed by: CLINICAL MEDICAL LABORATORY

## 2021-07-05 PROCEDURE — 36415 COLL VENOUS BLD VENIPUNCTURE: CPT | Performed by: CLINICAL MEDICAL LABORATORY

## 2021-07-05 PROCEDURE — 84080 ASSAY ALKALINE PHOSPHATASES: CPT | Performed by: CLINICAL MEDICAL LABORATORY

## 2021-07-07 ENCOUNTER — TELEPHONE (OUTPATIENT)
Dept: SCHEDULING | Age: 71
End: 2021-07-07

## 2021-07-09 LAB
ALP BONE CFR SERPL: 61.9 % (ref 10.7–68.3)
ALP INTEST CFR SERPL: 0 % (ref 0–24.2)
ALP LIVER CFR SERPL: 38.1 % (ref 26–86.2)
ALP SERPL-CCNC: 91 U/L (ref 34–123)
BONE FRACTION: 56.3 U/L (ref 12.9–52.6)
INTESTINE FRACTION: 0 U/L (ref 0–16.3)
LIVER FRACTION: 34.7 U/L (ref 16–69.3)

## 2021-07-12 ENCOUNTER — TELEPHONE (OUTPATIENT)
Dept: SCHEDULING | Age: 71
End: 2021-07-12

## 2021-08-04 ENCOUNTER — LAB ENCOUNTER (OUTPATIENT)
Dept: LAB | Age: 71
End: 2021-08-04
Attending: INTERNAL MEDICINE
Payer: MEDICARE

## 2021-08-04 DIAGNOSIS — M81.0 OSTEOPOROSIS: ICD-10-CM

## 2021-08-04 DIAGNOSIS — M80.00XA OSTEOPOROSIS WITH PATHOLOGICAL FRACTURE: Primary | ICD-10-CM

## 2021-08-04 LAB
ALBUMIN SERPL-MCNC: 3.5 G/DL (ref 3.4–5)
ALBUMIN/GLOB SERPL: 1 {RATIO} (ref 1–2)
ALP LIVER SERPL-CCNC: 88 U/L
ALT SERPL-CCNC: 17 U/L
ANION GAP SERPL CALC-SCNC: 8 MMOL/L (ref 0–18)
AST SERPL-CCNC: 16 U/L (ref 15–37)
BILIRUB SERPL-MCNC: 0.7 MG/DL (ref 0.1–2)
BUN BLD-MCNC: 11 MG/DL (ref 7–18)
BUN/CREAT SERPL: 14.5 (ref 10–20)
CALCIUM BLD-MCNC: 9.4 MG/DL (ref 8.5–10.1)
CHLORIDE SERPL-SCNC: 108 MMOL/L (ref 98–112)
CO2 SERPL-SCNC: 28 MMOL/L (ref 21–32)
CREAT BLD-MCNC: 0.76 MG/DL
GLOBULIN PLAS-MCNC: 3.4 G/DL (ref 2.8–4.4)
GLUCOSE BLD-MCNC: 87 MG/DL (ref 70–99)
M PROTEIN MFR SERPL ELPH: 6.9 G/DL (ref 6.4–8.2)
OSMOLALITY SERPL CALC.SUM OF ELEC: 297 MOSM/KG (ref 275–295)
PATIENT FASTING Y/N/NP: YES
PHOSPHATE SERPL-MCNC: 3.3 MG/DL (ref 2.5–4.9)
POTASSIUM SERPL-SCNC: 4.2 MMOL/L (ref 3.5–5.1)
SODIUM SERPL-SCNC: 144 MMOL/L (ref 136–145)
T4 FREE SERPL-MCNC: 0.9 NG/DL (ref 0.8–1.7)
TSI SER-ACNC: 4.18 MIU/ML (ref 0.36–3.74)
VIT D+METAB SERPL-MCNC: 53.8 NG/ML (ref 30–100)

## 2021-08-04 PROCEDURE — 36415 COLL VENOUS BLD VENIPUNCTURE: CPT

## 2021-08-04 PROCEDURE — 84443 ASSAY THYROID STIM HORMONE: CPT

## 2021-08-04 PROCEDURE — 80053 COMPREHEN METABOLIC PANEL: CPT

## 2021-08-04 PROCEDURE — 84439 ASSAY OF FREE THYROXINE: CPT

## 2021-08-04 PROCEDURE — 84100 ASSAY OF PHOSPHORUS: CPT

## 2021-08-04 PROCEDURE — 82306 VITAMIN D 25 HYDROXY: CPT

## 2021-08-24 ENCOUNTER — TELEPHONE (OUTPATIENT)
Dept: CARDIOLOGY | Age: 71
End: 2021-08-24

## 2021-08-24 DIAGNOSIS — F41.9 ANXIETY: ICD-10-CM

## 2021-08-24 RX ORDER — FLECAINIDE ACETATE 50 MG/1
50 TABLET ORAL 2 TIMES DAILY
Qty: 180 TABLET | Refills: 0 | Status: SHIPPED | OUTPATIENT
Start: 2021-08-24 | End: 2021-12-07 | Stop reason: SDUPTHER

## 2021-08-24 RX ORDER — ESCITALOPRAM OXALATE 20 MG/1
TABLET ORAL
Qty: 90 TABLET | Refills: 0 | Status: SHIPPED | OUTPATIENT
Start: 2021-08-24 | End: 2021-11-11

## 2021-09-02 ENCOUNTER — HOSPITAL ENCOUNTER (OUTPATIENT)
Dept: LAB | Age: 71
Discharge: HOME | End: 2021-09-02
Attending: INTERNAL MEDICINE

## 2021-09-02 ENCOUNTER — LAB REQUISITION (OUTPATIENT)
Dept: LAB | Age: 71
End: 2021-09-02

## 2021-09-02 DIAGNOSIS — R93.89 ABNORMAL FINDINGS ON DIAGNOSTIC IMAGING OF OTHER SPECIFIED BODY STRUCTURES: ICD-10-CM

## 2021-09-02 DIAGNOSIS — R10.11 RIGHT UPPER QUADRANT PAIN: ICD-10-CM

## 2021-09-02 DIAGNOSIS — D12.6 BENIGN NEOPLASM OF COLON, UNSPECIFIED: ICD-10-CM

## 2021-09-02 LAB
ALBUMIN SERPL-MCNC: 3.6 G/DL (ref 3.6–5.1)
ALP SERPL-CCNC: 75 UNITS/L (ref 45–117)
ALT SERPL-CCNC: 18 UNITS/L
AST SERPL-CCNC: 19 UNITS/L
BILIRUB CONJ SERPL-MCNC: <0.1 MG/DL (ref 0–0.2)
BILIRUB SERPL-MCNC: 0.4 MG/DL (ref 0.2–1)
FERRITIN SERPL-MCNC: 44 NG/ML (ref 8–252)
IRON SATN MFR SERPL: 40 % (ref 15–45)
IRON SERPL-MCNC: 148 MCG/DL (ref 50–170)
PROT SERPL-MCNC: 7 G/DL (ref 6.4–8.2)
TIBC SERPL-MCNC: 374 MCG/DL (ref 250–450)

## 2021-09-02 PROCEDURE — 86705 HEP B CORE ANTIBODY IGM: CPT | Performed by: CLINICAL MEDICAL LABORATORY

## 2021-09-02 PROCEDURE — 86704 HEP B CORE ANTIBODY TOTAL: CPT | Performed by: CLINICAL MEDICAL LABORATORY

## 2021-09-02 PROCEDURE — 36415 COLL VENOUS BLD VENIPUNCTURE: CPT | Performed by: CLINICAL MEDICAL LABORATORY

## 2021-09-02 PROCEDURE — 86255 FLUORESCENT ANTIBODY SCREEN: CPT | Performed by: CLINICAL MEDICAL LABORATORY

## 2021-09-02 PROCEDURE — 84466 ASSAY OF TRANSFERRIN: CPT | Performed by: CLINICAL MEDICAL LABORATORY

## 2021-09-02 PROCEDURE — 83516 IMMUNOASSAY NONANTIBODY: CPT | Performed by: CLINICAL MEDICAL LABORATORY

## 2021-09-02 PROCEDURE — 82728 ASSAY OF FERRITIN: CPT | Performed by: CLINICAL MEDICAL LABORATORY

## 2021-09-02 PROCEDURE — 86038 ANTINUCLEAR ANTIBODIES: CPT | Performed by: CLINICAL MEDICAL LABORATORY

## 2021-09-02 PROCEDURE — 83540 ASSAY OF IRON: CPT | Performed by: CLINICAL MEDICAL LABORATORY

## 2021-09-02 PROCEDURE — 80076 HEPATIC FUNCTION PANEL: CPT | Performed by: CLINICAL MEDICAL LABORATORY

## 2021-09-02 PROCEDURE — 82103 ALPHA-1-ANTITRYPSIN TOTAL: CPT | Performed by: CLINICAL MEDICAL LABORATORY

## 2021-09-03 ENCOUNTER — HOSPITAL ENCOUNTER (OUTPATIENT)
Dept: GASTROENTEROLOGY | Age: 71
Discharge: HOME | End: 2021-09-03
Attending: INTERNAL MEDICINE

## 2021-09-03 DIAGNOSIS — K76.0 FATTY LIVER: ICD-10-CM

## 2021-09-03 LAB
A1AT SERPL-MCNC: 138 MG/DL (ref 88–174)
ANA SER QL IA: NEGATIVE
HBV CORE IGG+IGM SER QL: NEGATIVE
HBV CORE IGM SER QL: NEGATIVE
TRANSFERRIN SERPL-MCNC: 307 MG/DL (ref 202–336)

## 2021-09-03 PROCEDURE — 13000022 HB GI BASIC CASE  S/U +1ST 15 MIN

## 2021-09-03 PROCEDURE — 13000023 HB GI BASIC CASE EA ADD MINUTE

## 2021-09-05 LAB
MITOCHONDRIA M2 IGG SER-ACNC: 2.6 UNITS
STRIA MUS IGG SER QL IF: NORMAL

## 2021-11-09 ENCOUNTER — CLINICAL ABSTRACT (OUTPATIENT)
Dept: INFUSION THERAPY | Age: 71
End: 2021-11-09

## 2021-11-09 ENCOUNTER — TELEPHONE (OUTPATIENT)
Dept: INFUSION THERAPY | Age: 71
End: 2021-11-09

## 2021-11-09 PROBLEM — M81.0 SENILE OSTEOPOROSIS: Status: ACTIVE | Noted: 2021-11-09

## 2021-11-11 ENCOUNTER — TELEPHONE (OUTPATIENT)
Dept: INFUSION THERAPY | Age: 71
End: 2021-11-11

## 2021-11-11 ENCOUNTER — TELEPHONE (OUTPATIENT)
Dept: SCHEDULING | Age: 71
End: 2021-11-11

## 2021-11-11 DIAGNOSIS — F41.9 ANXIETY: ICD-10-CM

## 2021-11-11 RX ORDER — ESCITALOPRAM OXALATE 20 MG/1
TABLET ORAL
Qty: 90 TABLET | Refills: 0 | Status: SHIPPED | OUTPATIENT
Start: 2021-11-11 | End: 2022-02-14

## 2021-11-16 ENCOUNTER — TELEPHONE (OUTPATIENT)
Dept: SCHEDULING | Age: 71
End: 2021-11-16

## 2021-11-17 ENCOUNTER — APPOINTMENT (OUTPATIENT)
Dept: FAMILY MEDICINE | Age: 71
End: 2021-11-17

## 2021-11-19 ENCOUNTER — HOSPITAL ENCOUNTER (OUTPATIENT)
Dept: INFUSION THERAPY | Age: 71
Discharge: STILL ADMITTED OR ADMINISTRATIVE | End: 2021-11-19
Attending: RADIOLOGY

## 2021-11-19 VITALS
OXYGEN SATURATION: 96 % | HEART RATE: 55 BPM | DIASTOLIC BLOOD PRESSURE: 69 MMHG | RESPIRATION RATE: 18 BRPM | SYSTOLIC BLOOD PRESSURE: 112 MMHG | TEMPERATURE: 97.6 F

## 2021-11-19 DIAGNOSIS — M81.0 SENILE OSTEOPOROSIS: Primary | ICD-10-CM

## 2021-11-19 PROCEDURE — 10002800 HB RX 250 W HCPCS: Performed by: INTERNAL MEDICINE

## 2021-11-19 PROCEDURE — 96372 THER/PROPH/DIAG INJ SC/IM: CPT

## 2021-11-19 RX ADMIN — DENOSUMAB 60 MG: 60 INJECTION SUBCUTANEOUS at 15:17

## 2021-11-19 ASSESSMENT — PAIN SCALES - GENERAL
PAINLEVEL_OUTOF10: 0
PAINLEVEL: 0

## 2021-11-19 ASSESSMENT — PATIENT HEALTH QUESTIONNAIRE - PHQ9
SUM OF ALL RESPONSES TO PHQ9 QUESTIONS 1 AND 2: 0
SUM OF ALL RESPONSES TO PHQ9 QUESTIONS 1 AND 2: 0
CLINICAL INTERPRETATION OF PHQ2 SCORE: NO FURTHER SCREENING NEEDED
2. FEELING DOWN, DEPRESSED OR HOPELESS: NOT AT ALL
1. LITTLE INTEREST OR PLEASURE IN DOING THINGS: NOT AT ALL

## 2021-12-07 ENCOUNTER — OFFICE VISIT (OUTPATIENT)
Dept: CARDIOLOGY | Age: 71
End: 2021-12-07

## 2021-12-07 VITALS
DIASTOLIC BLOOD PRESSURE: 66 MMHG | WEIGHT: 144 LBS | BODY MASS INDEX: 22.87 KG/M2 | SYSTOLIC BLOOD PRESSURE: 112 MMHG | HEART RATE: 55 BPM

## 2021-12-07 DIAGNOSIS — I48.0 PAROXYSMAL ATRIAL FIBRILLATION (CMD): Primary | ICD-10-CM

## 2021-12-07 DIAGNOSIS — I48.0 PAROXYSMAL ATRIAL FIBRILLATION (CMD): ICD-10-CM

## 2021-12-07 PROCEDURE — 93000 ELECTROCARDIOGRAM COMPLETE: CPT | Performed by: INTERNAL MEDICINE

## 2021-12-07 PROCEDURE — 99215 OFFICE O/P EST HI 40 MIN: CPT | Performed by: INTERNAL MEDICINE

## 2021-12-07 RX ORDER — FLECAINIDE ACETATE 50 MG/1
50 TABLET ORAL 2 TIMES DAILY
Qty: 180 TABLET | Refills: 0 | Status: SHIPPED | OUTPATIENT
Start: 2021-12-07 | End: 2022-02-16

## 2021-12-20 ENCOUNTER — TELEPHONE (OUTPATIENT)
Dept: FAMILY MEDICINE | Age: 71
End: 2021-12-20

## 2022-02-14 DIAGNOSIS — F41.9 ANXIETY: ICD-10-CM

## 2022-02-14 RX ORDER — ESCITALOPRAM OXALATE 20 MG/1
TABLET ORAL
Qty: 90 TABLET | Refills: 0 | Status: SHIPPED | OUTPATIENT
Start: 2022-02-14 | End: 2022-05-07

## 2022-02-16 RX ORDER — FLECAINIDE ACETATE 50 MG/1
50 TABLET ORAL 2 TIMES DAILY
Qty: 180 TABLET | Refills: 1 | Status: SHIPPED | OUTPATIENT
Start: 2022-02-16 | End: 2022-08-04

## 2022-04-23 ENCOUNTER — LAB ENCOUNTER (OUTPATIENT)
Dept: LAB | Age: 72
End: 2022-04-23
Attending: INTERNAL MEDICINE
Payer: MEDICARE

## 2022-04-23 DIAGNOSIS — M81.0 AGE-RELATED OSTEOPOROSIS WITHOUT CURRENT PATHOLOGICAL FRACTURE: ICD-10-CM

## 2022-04-23 LAB
ALBUMIN SERPL-MCNC: 3.6 G/DL (ref 3.4–5)
ALBUMIN/GLOB SERPL: 1.2 {RATIO} (ref 1–2)
ALP LIVER SERPL-CCNC: 50 U/L
ALT SERPL-CCNC: 17 U/L
ANION GAP SERPL CALC-SCNC: 8 MMOL/L (ref 0–18)
AST SERPL-CCNC: 19 U/L (ref 15–37)
BILIRUB SERPL-MCNC: 0.5 MG/DL (ref 0.1–2)
BUN BLD-MCNC: 12 MG/DL (ref 7–18)
BUN/CREAT SERPL: 14.1 (ref 10–20)
CALCIUM BLD-MCNC: 9.2 MG/DL (ref 8.5–10.1)
CHLORIDE SERPL-SCNC: 104 MMOL/L (ref 98–112)
CO2 SERPL-SCNC: 27 MMOL/L (ref 21–32)
CREAT BLD-MCNC: 0.85 MG/DL
FASTING STATUS PATIENT QL REPORTED: YES
GLOBULIN PLAS-MCNC: 2.9 G/DL (ref 2.8–4.4)
GLUCOSE BLD-MCNC: 93 MG/DL (ref 70–99)
OSMOLALITY SERPL CALC.SUM OF ELEC: 287 MOSM/KG (ref 275–295)
PHOSPHATE SERPL-MCNC: 4.1 MG/DL (ref 2.5–4.9)
POTASSIUM SERPL-SCNC: 4.3 MMOL/L (ref 3.5–5.1)
PROT SERPL-MCNC: 6.5 G/DL (ref 6.4–8.2)
SODIUM SERPL-SCNC: 139 MMOL/L (ref 136–145)
T4 FREE SERPL-MCNC: 1 NG/DL (ref 0.8–1.7)
TSI SER-ACNC: 3.04 MIU/ML (ref 0.36–3.74)
VIT D+METAB SERPL-MCNC: 74.7 NG/ML (ref 30–100)

## 2022-04-23 PROCEDURE — 80053 COMPREHEN METABOLIC PANEL: CPT

## 2022-04-23 PROCEDURE — 82306 VITAMIN D 25 HYDROXY: CPT

## 2022-04-23 PROCEDURE — 84439 ASSAY OF FREE THYROXINE: CPT

## 2022-04-23 PROCEDURE — 84100 ASSAY OF PHOSPHORUS: CPT

## 2022-04-23 PROCEDURE — 36415 COLL VENOUS BLD VENIPUNCTURE: CPT

## 2022-04-23 PROCEDURE — 84443 ASSAY THYROID STIM HORMONE: CPT

## 2022-04-26 ENCOUNTER — EXTERNAL RECORD (OUTPATIENT)
Dept: HEALTH INFORMATION MANAGEMENT | Facility: OTHER | Age: 72
End: 2022-04-26

## 2022-05-06 DIAGNOSIS — F41.9 ANXIETY: ICD-10-CM

## 2022-05-07 RX ORDER — ESCITALOPRAM OXALATE 20 MG/1
TABLET ORAL
Qty: 90 TABLET | Refills: 0 | Status: SHIPPED | OUTPATIENT
Start: 2022-05-07 | End: 2022-07-26

## 2022-06-21 ENCOUNTER — HOSPITAL ENCOUNTER (OUTPATIENT)
Dept: INFUSION THERAPY | Age: 72
Discharge: STILL ADMITTED OR ADMINISTRATIVE | End: 2022-06-21
Attending: INTERNAL MEDICINE

## 2022-06-21 VITALS
RESPIRATION RATE: 16 BRPM | DIASTOLIC BLOOD PRESSURE: 65 MMHG | SYSTOLIC BLOOD PRESSURE: 96 MMHG | OXYGEN SATURATION: 98 % | TEMPERATURE: 97.9 F | HEART RATE: 55 BPM

## 2022-06-21 DIAGNOSIS — M81.0 SENILE OSTEOPOROSIS: Primary | ICD-10-CM

## 2022-06-21 PROCEDURE — 96372 THER/PROPH/DIAG INJ SC/IM: CPT

## 2022-06-21 PROCEDURE — 10002800 HB RX 250 W HCPCS: Performed by: INTERNAL MEDICINE

## 2022-06-21 RX ADMIN — DENOSUMAB 60 MG: 60 INJECTION SUBCUTANEOUS at 14:12

## 2022-06-21 ASSESSMENT — PAIN SCALES - GENERAL
PAINLEVEL: 0
PAINLEVEL_OUTOF10: 0

## 2022-07-26 DIAGNOSIS — F41.9 ANXIETY: ICD-10-CM

## 2022-07-26 RX ORDER — ESCITALOPRAM OXALATE 20 MG/1
20 TABLET ORAL DAILY
Qty: 30 TABLET | Refills: 0 | Status: SHIPPED | OUTPATIENT
Start: 2022-07-26 | End: 2022-09-07

## 2022-08-04 RX ORDER — FLECAINIDE ACETATE 50 MG/1
TABLET ORAL
Qty: 180 TABLET | Refills: 0 | Status: SHIPPED | OUTPATIENT
Start: 2022-08-04 | End: 2022-12-29

## 2022-08-19 ENCOUNTER — TELEPHONE (OUTPATIENT)
Dept: SCHEDULING | Age: 72
End: 2022-08-19

## 2022-09-07 DIAGNOSIS — F41.9 ANXIETY: ICD-10-CM

## 2022-09-07 RX ORDER — ESCITALOPRAM OXALATE 20 MG/1
20 TABLET ORAL DAILY
Qty: 30 TABLET | Refills: 0 | Status: SHIPPED | OUTPATIENT
Start: 2022-09-07 | End: 2022-10-05

## 2022-09-20 ENCOUNTER — OFFICE VISIT (OUTPATIENT)
Dept: FAMILY MEDICINE | Age: 72
End: 2022-09-20

## 2022-09-20 VITALS
BODY MASS INDEX: 24.75 KG/M2 | SYSTOLIC BLOOD PRESSURE: 108 MMHG | DIASTOLIC BLOOD PRESSURE: 63 MMHG | WEIGHT: 154 LBS | HEART RATE: 64 BPM | RESPIRATION RATE: 16 BRPM | OXYGEN SATURATION: 97 % | HEIGHT: 66 IN | TEMPERATURE: 98.6 F

## 2022-09-20 DIAGNOSIS — D48.5 NEOPLASM OF UNCERTAIN BEHAVIOR OF SKIN: ICD-10-CM

## 2022-09-20 DIAGNOSIS — Z12.2 SCREENING FOR LUNG CANCER: ICD-10-CM

## 2022-09-20 DIAGNOSIS — I48.0 PAROXYSMAL ATRIAL FIBRILLATION (CMD): ICD-10-CM

## 2022-09-20 DIAGNOSIS — Z23 NEEDS FLU SHOT: ICD-10-CM

## 2022-09-20 DIAGNOSIS — Z85.3 HISTORY OF BREAST CANCER: Primary | ICD-10-CM

## 2022-09-20 DIAGNOSIS — R91.1 LUNG NODULE: ICD-10-CM

## 2022-09-20 DIAGNOSIS — F17.200 SMOKER: ICD-10-CM

## 2022-09-20 DIAGNOSIS — Z87.891 PERSONAL HISTORY OF SMOKING: ICD-10-CM

## 2022-09-20 DIAGNOSIS — N64.53 RETRACTED NIPPLE: ICD-10-CM

## 2022-09-20 DIAGNOSIS — Z12.11 SCREEN FOR COLON CANCER: ICD-10-CM

## 2022-09-20 PROBLEM — M54.50 CHRONIC RIGHT-SIDED LOW BACK PAIN WITHOUT SCIATICA: Status: ACTIVE | Noted: 2018-10-17

## 2022-09-20 PROBLEM — G89.29 CHRONIC RIGHT-SIDED LOW BACK PAIN WITHOUT SCIATICA: Status: ACTIVE | Noted: 2018-10-17

## 2022-09-20 PROBLEM — D05.90 CARCINOMA IN SITU OF BREAST: Status: ACTIVE | Noted: 2022-09-20

## 2022-09-20 PROCEDURE — 90662 IIV NO PRSV INCREASED AG IM: CPT | Performed by: FAMILY MEDICINE

## 2022-09-20 PROCEDURE — G0008 ADMIN INFLUENZA VIRUS VAC: HCPCS | Performed by: FAMILY MEDICINE

## 2022-09-20 PROCEDURE — G0438 PPPS, INITIAL VISIT: HCPCS | Performed by: FAMILY MEDICINE

## 2022-09-20 PROCEDURE — 99213 OFFICE O/P EST LOW 20 MIN: CPT | Performed by: FAMILY MEDICINE

## 2022-09-20 ASSESSMENT — PATIENT HEALTH QUESTIONNAIRE - PHQ9
2. FEELING DOWN, DEPRESSED OR HOPELESS: NOT AT ALL
SUM OF ALL RESPONSES TO PHQ9 QUESTIONS 1 AND 2: 0
SUM OF ALL RESPONSES TO PHQ9 QUESTIONS 1 AND 2: 0
CLINICAL INTERPRETATION OF PHQ2 SCORE: NO FURTHER SCREENING NEEDED
1. LITTLE INTEREST OR PLEASURE IN DOING THINGS: NOT AT ALL

## 2022-09-20 ASSESSMENT — ENCOUNTER SYMPTOMS
CONSTITUTIONAL NEGATIVE: 1
ALLERGIC/IMMUNOLOGIC NEGATIVE: 1
NEUROLOGICAL NEGATIVE: 1
RESPIRATORY NEGATIVE: 1
HEMATOLOGIC/LYMPHATIC NEGATIVE: 1
EYES NEGATIVE: 1
GASTROINTESTINAL NEGATIVE: 1
PSYCHIATRIC NEGATIVE: 1
ENDOCRINE NEGATIVE: 1

## 2022-10-05 DIAGNOSIS — F41.9 ANXIETY: ICD-10-CM

## 2022-10-05 RX ORDER — ESCITALOPRAM OXALATE 20 MG/1
20 TABLET ORAL DAILY
Qty: 90 TABLET | Refills: 3 | Status: SHIPPED | OUTPATIENT
Start: 2022-10-05 | End: 2023-09-20

## 2022-10-25 ENCOUNTER — HOSPITAL ENCOUNTER (OUTPATIENT)
Dept: MAMMOGRAPHY | Age: 72
Discharge: HOME | End: 2022-10-25
Attending: FAMILY MEDICINE

## 2022-10-25 ENCOUNTER — HOSPITAL ENCOUNTER (OUTPATIENT)
Dept: ULTRASOUND IMAGING | Age: 72
Discharge: HOME | End: 2022-10-25
Attending: FAMILY MEDICINE

## 2022-10-25 DIAGNOSIS — N64.53 RETRACTED NIPPLE: ICD-10-CM

## 2022-10-25 PROCEDURE — 76642 ULTRASOUND BREAST LIMITED: CPT

## 2022-10-25 PROCEDURE — G0279 TOMOSYNTHESIS, MAMMO: HCPCS

## 2022-10-26 ENCOUNTER — HOSPITAL ENCOUNTER (OUTPATIENT)
Dept: CT IMAGING | Age: 72
Discharge: HOME | End: 2022-10-26
Attending: FAMILY MEDICINE

## 2022-10-26 DIAGNOSIS — Z87.891 PERSONAL HISTORY OF SMOKING: ICD-10-CM

## 2022-10-26 DIAGNOSIS — Z12.2 SCREENING FOR LUNG CANCER: ICD-10-CM

## 2022-10-26 DIAGNOSIS — F17.200 SMOKER: ICD-10-CM

## 2022-10-26 PROCEDURE — 71271 CT THORAX LUNG CANCER SCR C-: CPT

## 2022-10-31 ENCOUNTER — APPOINTMENT (OUTPATIENT)
Dept: INTERNAL MEDICINE | Age: 72
End: 2022-10-31

## 2022-10-31 ENCOUNTER — E-ADVICE (OUTPATIENT)
Dept: FAMILY MEDICINE | Age: 72
End: 2022-10-31

## 2022-10-31 ENCOUNTER — TELEPHONE (OUTPATIENT)
Dept: FAMILY MEDICINE | Age: 72
End: 2022-10-31

## 2022-11-01 ENCOUNTER — APPOINTMENT (OUTPATIENT)
Dept: INTERNAL MEDICINE | Age: 72
End: 2022-11-01

## 2022-12-02 ENCOUNTER — APPOINTMENT (OUTPATIENT)
Dept: URBAN - METROPOLITAN AREA CLINIC 248 | Age: 72
Setting detail: DERMATOLOGY
End: 2022-12-02

## 2022-12-02 DIAGNOSIS — L82.1 OTHER SEBORRHEIC KERATOSIS: ICD-10-CM

## 2022-12-02 DIAGNOSIS — L57.0 ACTINIC KERATOSIS: ICD-10-CM

## 2022-12-02 DIAGNOSIS — D22 MELANOCYTIC NEVI: ICD-10-CM

## 2022-12-02 DIAGNOSIS — L82.0 INFLAMED SEBORRHEIC KERATOSIS: ICD-10-CM

## 2022-12-02 DIAGNOSIS — Z71.89 OTHER SPECIFIED COUNSELING: ICD-10-CM

## 2022-12-02 DIAGNOSIS — Q27.8 OTHER SPECIFIED CONGENITAL MALFORMATIONS OF PERIPHERAL VASCULAR SYSTEM: ICD-10-CM

## 2022-12-02 DIAGNOSIS — R20.2 PARESTHESIA OF SKIN: ICD-10-CM

## 2022-12-02 DIAGNOSIS — D18.0 HEMANGIOMA: ICD-10-CM

## 2022-12-02 DIAGNOSIS — L81.4 OTHER MELANIN HYPERPIGMENTATION: ICD-10-CM

## 2022-12-02 DIAGNOSIS — L23.89 ALLERGIC CONTACT DERMATITIS DUE TO OTHER AGENTS: ICD-10-CM

## 2022-12-02 DIAGNOSIS — D49.2 NEOPLASM OF UNSPECIFIED BEHAVIOR OF BONE, SOFT TISSUE, AND SKIN: ICD-10-CM

## 2022-12-02 PROBLEM — D18.01 HEMANGIOMA OF SKIN AND SUBCUTANEOUS TISSUE: Status: ACTIVE | Noted: 2022-12-02

## 2022-12-02 PROBLEM — D22.5 MELANOCYTIC NEVI OF TRUNK: Status: ACTIVE | Noted: 2022-12-02

## 2022-12-02 PROCEDURE — OTHER PRESCRIPTION: OTHER

## 2022-12-02 PROCEDURE — OTHER PRESCRIPTION MEDICATION MANAGEMENT: OTHER

## 2022-12-02 PROCEDURE — OTHER LIQUID NITROGEN: OTHER

## 2022-12-02 PROCEDURE — 17110 DESTRUCT B9 LESION 1-14: CPT | Mod: 59

## 2022-12-02 PROCEDURE — A4550 SURGICAL TRAYS: HCPCS

## 2022-12-02 PROCEDURE — OTHER COUNSELING: OTHER

## 2022-12-02 PROCEDURE — 99204 OFFICE O/P NEW MOD 45 MIN: CPT | Mod: 25

## 2022-12-02 PROCEDURE — OTHER MIPS QUALITY: OTHER

## 2022-12-02 PROCEDURE — OTHER SHAVE REMOVAL: OTHER

## 2022-12-02 PROCEDURE — 17000 DESTRUCT PREMALG LESION: CPT | Mod: 59

## 2022-12-02 PROCEDURE — 11302 SHAVE SKIN LESION 1.1-2.0 CM: CPT

## 2022-12-02 RX ORDER — TRIAMCINOLONE ACETONIDE 0.25 MG/G
CREAM TOPICAL
Qty: 15 | Refills: 3 | Status: ERX | COMMUNITY
Start: 2022-12-02

## 2022-12-02 ASSESSMENT — LOCATION DETAILED DESCRIPTION DERM
LOCATION DETAILED: LEFT SUPERIOR UPPER BACK
LOCATION DETAILED: LEFT MID-UPPER BACK
LOCATION DETAILED: NASAL DORSUM
LOCATION DETAILED: LEFT LATERAL UPPER BACK
LOCATION DETAILED: RIGHT DISTAL LATERAL POSTERIOR THIGH
LOCATION DETAILED: RIGHT RIB CAGE
LOCATION DETAILED: RIGHT MEDIAL UPPER BACK
LOCATION DETAILED: STERNUM

## 2022-12-02 ASSESSMENT — LOCATION ZONE DERM
LOCATION ZONE: LEG
LOCATION ZONE: TRUNK
LOCATION ZONE: NOSE

## 2022-12-02 ASSESSMENT — LOCATION SIMPLE DESCRIPTION DERM
LOCATION SIMPLE: RIGHT UPPER BACK
LOCATION SIMPLE: ABDOMEN
LOCATION SIMPLE: LEFT UPPER BACK
LOCATION SIMPLE: NOSE
LOCATION SIMPLE: RIGHT POSTERIOR THIGH
LOCATION SIMPLE: CHEST

## 2022-12-02 NOTE — PROCEDURE: LIQUID NITROGEN
Show Topical Anesthesia Variable?: Yes
Spray Paint Text: The liquid nitrogen was applied to the skin utilizing a spray paint frosting technique.
Render Note In Bullet Format When Appropriate: No
Number Of Freeze-Thaw Cycles: 2 freeze-thaw cycles
Detail Level: Detailed
Post-Care Instructions: I reviewed with the patient in detail post-care instructions. Patient is to wear sunprotection, and avoid picking at any of the treated lesions. Pt may apply Vaseline to crusted or scabbing areas.
Medical Necessity Information: It is in your best interest to select a reason for this procedure from the list below. All of these items fulfill various CMS LCD requirements except the new and changing color options.
Number Of Freeze-Thaw Cycles: 1 freeze-thaw cycle
Application Tool (Optional): Liquid Nitrogen Sprayer
Consent: The patient's consent was obtained including but not limited to risks of crusting, scabbing, blistering, scarring, darker or lighter pigmentary change, recurrence, incomplete removal and infection.
Medical Necessity Clause: This procedure was medically necessary because the lesions that were treated were: inflamed and itchy
Duration Of Freeze Thaw-Cycle (Seconds): 10
Duration Of Freeze Thaw-Cycle (Seconds): 5-10

## 2022-12-02 NOTE — PROCEDURE: PRESCRIPTION MEDICATION MANAGEMENT
Render In Strict Bullet Format?: No
Plan: Tried and failed cornstarch and antibiotic ointment
Initiate Treatment: triamcinolone acetonide 0.025 % topical cream\\nSig: Aaa BID until resolved
Detail Level: Zone

## 2022-12-02 NOTE — PROCEDURE: SHAVE REMOVAL
Medical Necessity Clause: This procedure was medically necessary because the lesion that was treated was:
Render Post-Care Instructions In Note?: yes
Size Of Lesion In Cm (Required): 1.2
Hemostasis: Drysol
Post-Care Instructions: I reviewed with the patient in detail post-care instructions. Patient is to keep the biopsy site dry overnight, and then apply aquaphor or vaseline twice daily until healed.
Anesthesia Volume In Cc: 0.2
Render Path Notes In Note?: No
Billing Type: Third-Party Bill
Wound Care: Petrolatum
Notification Instructions: Patient will be notified of pathology results. However, patient instructed to call the office if not contacted within 2 weeks.
X Size Of Lesion In Cm (Optional): 0
Consent was obtained from the patient. The risks and benefits to therapy were discussed in detail. Specifically, the risks of infection, scarring, bleeding, prolonged wound healing, incomplete removal, allergy to anesthesia, nerve injury and recurrence were addressed. Prior to the procedure, the treatment site was clearly identified and confirmed by the patient. All components of Universal Protocol/PAUSE Rule completed.
Detail Level: Detailed
Anesthesia Type: 0.5% lidocaine with 1:200,000 epinephrine
Biopsy Method: Dermablade
Body Location Override (Optional - Billing Will Still Be Based On Selected Body Map Location If Applicable): under right breast
Medical Necessity Information: It is in your best interest to select a reason for this procedure from the list below. All of these items fulfill various CMS LCD requirements except the new and changing color options.
Depth Of Shave: dermis

## 2023-01-01 ENCOUNTER — EXTERNAL RECORD (OUTPATIENT)
Dept: INFUSION THERAPY | Age: 73
End: 2023-01-01

## 2023-01-05 ENCOUNTER — TELEPHONE (OUTPATIENT)
Dept: FAMILY MEDICINE | Age: 73
End: 2023-01-05

## 2023-01-06 ENCOUNTER — APPOINTMENT (OUTPATIENT)
Dept: INFUSION THERAPY | Age: 73
End: 2023-01-06
Attending: INTERNAL MEDICINE

## 2023-01-10 ENCOUNTER — LAB ENCOUNTER (OUTPATIENT)
Dept: LAB | Age: 73
End: 2023-01-10
Attending: INTERNAL MEDICINE
Payer: MEDICARE

## 2023-01-10 DIAGNOSIS — R94.6 BORDERLINE ABNORMAL THYROID FUNCTION TEST: Primary | ICD-10-CM

## 2023-01-10 DIAGNOSIS — E55.9 VITAMIN D DEFICIENCY: ICD-10-CM

## 2023-01-10 DIAGNOSIS — M80.00XA AGE-RELATED OSTEOPOROSIS WITH CURRENT PATHOLOGICAL FRACTURE, INITIAL ENCOUNTER: ICD-10-CM

## 2023-01-10 LAB
ALBUMIN SERPL-MCNC: 3.6 G/DL (ref 3.4–5)
ALBUMIN/GLOB SERPL: 1.1 {RATIO} (ref 1–2)
ALP LIVER SERPL-CCNC: 57 U/L
ALT SERPL-CCNC: 14 U/L
ANION GAP SERPL CALC-SCNC: 4 MMOL/L (ref 0–18)
AST SERPL-CCNC: 14 U/L (ref 15–37)
BILIRUB SERPL-MCNC: 0.7 MG/DL (ref 0.1–2)
BUN BLD-MCNC: 13 MG/DL (ref 7–18)
BUN/CREAT SERPL: 15.1 (ref 10–20)
CALCIUM BLD-MCNC: 9.4 MG/DL (ref 8.5–10.1)
CHLORIDE SERPL-SCNC: 109 MMOL/L (ref 98–112)
CO2 SERPL-SCNC: 29 MMOL/L (ref 21–32)
CREAT BLD-MCNC: 0.86 MG/DL
FASTING STATUS PATIENT QL REPORTED: YES
GFR SERPLBLD BASED ON 1.73 SQ M-ARVRAT: 71 ML/MIN/1.73M2 (ref 60–?)
GLOBULIN PLAS-MCNC: 3.2 G/DL (ref 2.8–4.4)
GLUCOSE BLD-MCNC: 94 MG/DL (ref 70–99)
OSMOLALITY SERPL CALC.SUM OF ELEC: 294 MOSM/KG (ref 275–295)
POTASSIUM SERPL-SCNC: 4.1 MMOL/L (ref 3.5–5.1)
PROT SERPL-MCNC: 6.8 G/DL (ref 6.4–8.2)
SODIUM SERPL-SCNC: 142 MMOL/L (ref 136–145)
T3FREE SERPL-MCNC: 2.29 PG/ML (ref 2.4–4.2)
T4 FREE SERPL-MCNC: 0.9 NG/DL (ref 0.8–1.7)
TSI SER-ACNC: 4.33 MIU/ML (ref 0.36–3.74)
VIT D+METAB SERPL-MCNC: 86.7 NG/ML (ref 30–100)

## 2023-01-10 PROCEDURE — 84481 FREE ASSAY (FT-3): CPT

## 2023-01-10 PROCEDURE — 82306 VITAMIN D 25 HYDROXY: CPT

## 2023-01-10 PROCEDURE — 84443 ASSAY THYROID STIM HORMONE: CPT

## 2023-01-10 PROCEDURE — 80053 COMPREHEN METABOLIC PANEL: CPT

## 2023-01-10 PROCEDURE — 36415 COLL VENOUS BLD VENIPUNCTURE: CPT

## 2023-01-10 PROCEDURE — 84439 ASSAY OF FREE THYROXINE: CPT

## 2023-01-11 ENCOUNTER — HOSPITAL ENCOUNTER (OUTPATIENT)
Dept: INFUSION THERAPY | Age: 73
Discharge: STILL ADMITTED OR ADMINISTRATIVE | End: 2023-01-11
Attending: FAMILY MEDICINE

## 2023-01-11 VITALS
SYSTOLIC BLOOD PRESSURE: 110 MMHG | RESPIRATION RATE: 16 BRPM | HEART RATE: 52 BPM | DIASTOLIC BLOOD PRESSURE: 72 MMHG | TEMPERATURE: 97.3 F | OXYGEN SATURATION: 97 %

## 2023-01-11 DIAGNOSIS — M81.0 SENILE OSTEOPOROSIS: Primary | ICD-10-CM

## 2023-01-11 PROCEDURE — 10002800 HB RX 250 W HCPCS: Performed by: INTERNAL MEDICINE

## 2023-01-11 PROCEDURE — 96372 THER/PROPH/DIAG INJ SC/IM: CPT

## 2023-01-11 RX ADMIN — DENOSUMAB 60 MG: 60 INJECTION SUBCUTANEOUS at 14:31

## 2023-01-31 ENCOUNTER — OFFICE VISIT (OUTPATIENT)
Dept: CARDIOLOGY | Age: 73
End: 2023-01-31

## 2023-01-31 VITALS
BODY MASS INDEX: 24.7 KG/M2 | DIASTOLIC BLOOD PRESSURE: 71 MMHG | HEART RATE: 58 BPM | OXYGEN SATURATION: 97 % | SYSTOLIC BLOOD PRESSURE: 118 MMHG | WEIGHT: 153.66 LBS | HEIGHT: 66 IN

## 2023-01-31 DIAGNOSIS — I48.0 PAROXYSMAL ATRIAL FIBRILLATION (CMD): Primary | ICD-10-CM

## 2023-01-31 PROCEDURE — 93000 ELECTROCARDIOGRAM COMPLETE: CPT | Performed by: INTERNAL MEDICINE

## 2023-01-31 PROCEDURE — 99215 OFFICE O/P EST HI 40 MIN: CPT | Performed by: INTERNAL MEDICINE

## 2023-01-31 ASSESSMENT — PATIENT HEALTH QUESTIONNAIRE - PHQ9
SUM OF ALL RESPONSES TO PHQ9 QUESTIONS 1 AND 2: 0
CLINICAL INTERPRETATION OF PHQ2 SCORE: NO FURTHER SCREENING NEEDED
SUM OF ALL RESPONSES TO PHQ9 QUESTIONS 1 AND 2: 0
1. LITTLE INTEREST OR PLEASURE IN DOING THINGS: NOT AT ALL
2. FEELING DOWN, DEPRESSED OR HOPELESS: NOT AT ALL

## 2023-02-04 ENCOUNTER — HOSPITAL ENCOUNTER (OUTPATIENT)
Age: 73
Setting detail: OBSERVATION
Discharge: HOME | End: 2023-02-06
Attending: EMERGENCY MEDICINE | Admitting: INTERNAL MEDICINE

## 2023-02-04 ENCOUNTER — APPOINTMENT (OUTPATIENT)
Dept: GENERAL RADIOLOGY | Age: 73
End: 2023-02-04
Attending: EMERGENCY MEDICINE

## 2023-02-04 ENCOUNTER — APPOINTMENT (OUTPATIENT)
Dept: CT IMAGING | Age: 73
End: 2023-02-04
Attending: EMERGENCY MEDICINE

## 2023-02-04 DIAGNOSIS — I48.0 PAROXYSMAL ATRIAL FIBRILLATION (CMD): Primary | ICD-10-CM

## 2023-02-04 PROBLEM — E03.8 SUBCLINICAL HYPOTHYROIDISM: Status: ACTIVE | Noted: 2023-02-04

## 2023-02-04 PROBLEM — I10 PRIMARY HYPERTENSION: Status: ACTIVE | Noted: 2023-02-04

## 2023-02-04 LAB
ALBUMIN SERPL-MCNC: 3.8 G/DL (ref 3.6–5.1)
ALBUMIN/GLOB SERPL: 1 {RATIO} (ref 1–2.4)
ALP SERPL-CCNC: 58 UNITS/L (ref 45–117)
ALT SERPL-CCNC: 18 UNITS/L
ANION GAP SERPL CALC-SCNC: 10 MMOL/L (ref 7–19)
AST SERPL-CCNC: 19 UNITS/L
BASOPHILS # BLD: 0 K/MCL (ref 0–0.3)
BASOPHILS NFR BLD: 0 %
BILIRUB SERPL-MCNC: 0.4 MG/DL (ref 0.2–1)
BUN SERPL-MCNC: 14 MG/DL (ref 6–20)
BUN/CREAT SERPL: 20 (ref 7–25)
CALCIUM SERPL-MCNC: 9.2 MG/DL (ref 8.4–10.2)
CHLORIDE SERPL-SCNC: 107 MMOL/L (ref 97–110)
CO2 SERPL-SCNC: 27 MMOL/L (ref 21–32)
CREAT SERPL-MCNC: 0.7 MG/DL (ref 0.51–0.95)
D DIMER PPP FEU-MCNC: 1.05 MG/L (FEU)
DEPRECATED RDW RBC: 49 FL (ref 39–50)
EOSINOPHIL # BLD: 0.1 K/MCL (ref 0–0.5)
EOSINOPHIL NFR BLD: 1 %
ERYTHROCYTE [DISTWIDTH] IN BLOOD: 13.4 % (ref 11–15)
FASTING DURATION TIME PATIENT: NORMAL H
GFR SERPLBLD BASED ON 1.73 SQ M-ARVRAT: >90 ML/MIN
GLOBULIN SER-MCNC: 3.7 G/DL (ref 2–4)
GLUCOSE SERPL-MCNC: 88 MG/DL (ref 70–99)
HCT VFR BLD CALC: 45.6 % (ref 36–46.5)
HGB BLD-MCNC: 15 G/DL (ref 12–15.5)
IMM GRANULOCYTES # BLD AUTO: 0 K/MCL (ref 0–0.2)
IMM GRANULOCYTES # BLD: 0 %
LYMPHOCYTES # BLD: 2.7 K/MCL (ref 1–4)
LYMPHOCYTES NFR BLD: 30 %
MAGNESIUM SERPL-MCNC: 2.2 MG/DL (ref 1.7–2.4)
MCH RBC QN AUTO: 32.4 PG (ref 26–34)
MCHC RBC AUTO-ENTMCNC: 32.9 G/DL (ref 32–36.5)
MCV RBC AUTO: 98.5 FL (ref 78–100)
MONOCYTES # BLD: 0.8 K/MCL (ref 0.3–0.9)
MONOCYTES NFR BLD: 9 %
NEUTROPHILS # BLD: 5.3 K/MCL (ref 1.8–7.7)
NEUTROPHILS NFR BLD: 60 %
NRBC BLD MANUAL-RTO: 0 /100 WBC
NT-PROBNP SERPL-MCNC: 415 PG/ML
PLATELET # BLD AUTO: 256 K/MCL (ref 140–450)
POTASSIUM SERPL-SCNC: 4.2 MMOL/L (ref 3.4–5.1)
PROT SERPL-MCNC: 7.5 G/DL (ref 6.4–8.2)
RBC # BLD: 4.63 MIL/MCL (ref 4–5.2)
SODIUM SERPL-SCNC: 140 MMOL/L (ref 135–145)
T4 FREE SERPL-MCNC: 1 NG/DL (ref 0.8–1.5)
TROPONIN I SERPL DL<=0.01 NG/ML-MCNC: 17 NG/L
TROPONIN I SERPL DL<=0.01 NG/ML-MCNC: 8 NG/L
TSH SERPL-ACNC: 7 MCUNITS/ML (ref 0.35–5)
WBC # BLD: 9 K/MCL (ref 4.2–11)

## 2023-02-04 PROCEDURE — 84443 ASSAY THYROID STIM HORMONE: CPT | Performed by: EMERGENCY MEDICINE

## 2023-02-04 PROCEDURE — 84484 ASSAY OF TROPONIN QUANT: CPT | Performed by: EMERGENCY MEDICINE

## 2023-02-04 PROCEDURE — 80053 COMPREHEN METABOLIC PANEL: CPT | Performed by: EMERGENCY MEDICINE

## 2023-02-04 PROCEDURE — 71045 X-RAY EXAM CHEST 1 VIEW: CPT

## 2023-02-04 PROCEDURE — 96360 HYDRATION IV INFUSION INIT: CPT

## 2023-02-04 PROCEDURE — 85379 FIBRIN DEGRADATION QUANT: CPT | Performed by: EMERGENCY MEDICINE

## 2023-02-04 PROCEDURE — 85025 COMPLETE CBC W/AUTO DIFF WBC: CPT | Performed by: EMERGENCY MEDICINE

## 2023-02-04 PROCEDURE — 10002803 HB RX 637: Performed by: EMERGENCY MEDICINE

## 2023-02-04 PROCEDURE — 71275 CT ANGIOGRAPHY CHEST: CPT

## 2023-02-04 PROCEDURE — 99223 1ST HOSP IP/OBS HIGH 75: CPT | Performed by: INTERNAL MEDICINE

## 2023-02-04 PROCEDURE — 83735 ASSAY OF MAGNESIUM: CPT | Performed by: EMERGENCY MEDICINE

## 2023-02-04 PROCEDURE — 10002805 HB CONTRAST AGENT: Performed by: EMERGENCY MEDICINE

## 2023-02-04 PROCEDURE — 84439 ASSAY OF FREE THYROXINE: CPT | Performed by: EMERGENCY MEDICINE

## 2023-02-04 PROCEDURE — 99285 EMERGENCY DEPT VISIT HI MDM: CPT

## 2023-02-04 PROCEDURE — G0378 HOSPITAL OBSERVATION PER HR: HCPCS

## 2023-02-04 PROCEDURE — 10002807 HB RX 258: Performed by: EMERGENCY MEDICINE

## 2023-02-04 PROCEDURE — 83880 ASSAY OF NATRIURETIC PEPTIDE: CPT | Performed by: EMERGENCY MEDICINE

## 2023-02-04 PROCEDURE — 93005 ELECTROCARDIOGRAM TRACING: CPT | Performed by: EMERGENCY MEDICINE

## 2023-02-04 RX ORDER — NICOTINE 21 MG/24HR
1 PATCH, TRANSDERMAL 24 HOURS TRANSDERMAL ONCE
Status: COMPLETED | OUTPATIENT
Start: 2023-02-04 | End: 2023-02-05

## 2023-02-04 RX ORDER — FLECAINIDE ACETATE 50 MG/1
50 TABLET ORAL ONCE
Status: COMPLETED | OUTPATIENT
Start: 2023-02-04 | End: 2023-02-04

## 2023-02-04 RX ADMIN — IOHEXOL 65 ML: 350 INJECTION, SOLUTION INTRAVENOUS at 22:21

## 2023-02-04 RX ADMIN — METOPROLOL TARTRATE 25 MG: 25 TABLET, FILM COATED ORAL at 22:58

## 2023-02-04 RX ADMIN — Medication 1 PATCH: at 23:36

## 2023-02-04 RX ADMIN — FLECAINIDE ACETATE TABLET 50 MG: 50 TABLET ORAL at 23:35

## 2023-02-04 RX ADMIN — SODIUM CHLORIDE 1000 ML: 9 INJECTION, SOLUTION INTRAVENOUS at 21:16

## 2023-02-04 ASSESSMENT — PAIN SCALES - GENERAL: PAINLEVEL_OUTOF10: 0

## 2023-02-05 ENCOUNTER — APPOINTMENT (OUTPATIENT)
Dept: CARDIOLOGY | Age: 73
End: 2023-02-05
Attending: INTERNAL MEDICINE

## 2023-02-05 LAB
ANION GAP SERPL CALC-SCNC: 8 MMOL/L (ref 7–19)
AORTIC VALVE AREA (AVA): 0.8
AV STENOSIS SEVERITY TEXT: NORMAL
BUN SERPL-MCNC: 10 MG/DL (ref 6–20)
BUN/CREAT SERPL: 16 (ref 7–25)
CALCIUM SERPL-MCNC: 8.3 MG/DL (ref 8.4–10.2)
CHLORIDE SERPL-SCNC: 111 MMOL/L (ref 97–110)
CO2 SERPL-SCNC: 28 MMOL/L (ref 21–32)
CREAT SERPL-MCNC: 0.62 MG/DL (ref 0.51–0.95)
DEPRECATED RDW RBC: 49.3 FL (ref 39–50)
E WAVE DECELARATION TIME (MDT): 13.65
ERYTHROCYTE [DISTWIDTH] IN BLOOD: 13.2 % (ref 11–15)
FASTING DURATION TIME PATIENT: ABNORMAL H
GFR SERPLBLD BASED ON 1.73 SQ M-ARVRAT: >90 ML/MIN
GLUCOSE SERPL-MCNC: 90 MG/DL (ref 70–99)
HCT VFR BLD CALC: 43.1 % (ref 36–46.5)
HGB BLD-MCNC: 14 G/DL (ref 12–15.5)
LV EF: NORMAL %
MAGNESIUM SERPL-MCNC: 2.2 MG/DL (ref 1.7–2.4)
MCH RBC QN AUTO: 32.5 PG (ref 26–34)
MCHC RBC AUTO-ENTMCNC: 32.5 G/DL (ref 32–36.5)
MCV RBC AUTO: 100 FL (ref 78–100)
MV E TISSUE VEL MED (MESV): 7.18
MV E WAVE VEL/E TISSUE VEL MED(MSR): 5.87
MV PEAK A VELOCITY (MVPAV): 198
MV PEAK E VELOCITY (MVPEV): 0.56
NRBC BLD MANUAL-RTO: 0 /100 WBC
PLATELET # BLD AUTO: 222 K/MCL (ref 140–450)
POTASSIUM SERPL-SCNC: 4 MMOL/L (ref 3.4–5.1)
RAINBOW EXTRA TUBES HOLD SPECIMEN: NORMAL
RAINBOW EXTRA TUBES HOLD SPECIMEN: NORMAL
RBC # BLD: 4.31 MIL/MCL (ref 4–5.2)
SODIUM SERPL-SCNC: 143 MMOL/L (ref 135–145)
WBC # BLD: 8.4 K/MCL (ref 4.2–11)

## 2023-02-05 PROCEDURE — 96372 THER/PROPH/DIAG INJ SC/IM: CPT

## 2023-02-05 PROCEDURE — G0378 HOSPITAL OBSERVATION PER HR: HCPCS

## 2023-02-05 PROCEDURE — 36415 COLL VENOUS BLD VENIPUNCTURE: CPT | Performed by: INTERNAL MEDICINE

## 2023-02-05 PROCEDURE — 10002803 HB RX 637: Performed by: INTERNAL MEDICINE

## 2023-02-05 PROCEDURE — 10002800 HB RX 250 W HCPCS: Performed by: INTERNAL MEDICINE

## 2023-02-05 PROCEDURE — 10004651 HB RX, NO CHARGE ITEM: Performed by: INTERNAL MEDICINE

## 2023-02-05 PROCEDURE — 93306 TTE W/DOPPLER COMPLETE: CPT | Performed by: INTERNAL MEDICINE

## 2023-02-05 PROCEDURE — 85027 COMPLETE CBC AUTOMATED: CPT | Performed by: INTERNAL MEDICINE

## 2023-02-05 PROCEDURE — 99233 SBSQ HOSP IP/OBS HIGH 50: CPT | Performed by: INTERNAL MEDICINE

## 2023-02-05 PROCEDURE — 83735 ASSAY OF MAGNESIUM: CPT | Performed by: INTERNAL MEDICINE

## 2023-02-05 PROCEDURE — 93306 TTE W/DOPPLER COMPLETE: CPT

## 2023-02-05 PROCEDURE — 99223 1ST HOSP IP/OBS HIGH 75: CPT | Performed by: INTERNAL MEDICINE

## 2023-02-05 PROCEDURE — 80048 BASIC METABOLIC PNL TOTAL CA: CPT | Performed by: INTERNAL MEDICINE

## 2023-02-05 RX ORDER — AMOXICILLIN 250 MG
2 CAPSULE ORAL DAILY PRN
Status: DISCONTINUED | OUTPATIENT
Start: 2023-02-05 | End: 2023-02-06 | Stop reason: HOSPADM

## 2023-02-05 RX ORDER — POTASSIUM CHLORIDE 20 MEQ/1
40 TABLET, EXTENDED RELEASE ORAL ONCE
Status: COMPLETED | OUTPATIENT
Start: 2023-02-05 | End: 2023-02-05

## 2023-02-05 RX ORDER — 0.9 % SODIUM CHLORIDE 0.9 %
2 VIAL (ML) INJECTION EVERY 12 HOURS SCHEDULED
Status: DISCONTINUED | OUTPATIENT
Start: 2023-02-05 | End: 2023-02-06 | Stop reason: HOSPADM

## 2023-02-05 RX ORDER — POLYETHYLENE GLYCOL 3350 17 G/17G
17 POWDER, FOR SOLUTION ORAL DAILY PRN
Status: DISCONTINUED | OUTPATIENT
Start: 2023-02-05 | End: 2023-02-06 | Stop reason: HOSPADM

## 2023-02-05 RX ORDER — FLECAINIDE ACETATE 50 MG/1
50 TABLET ORAL 2 TIMES DAILY
Status: DISCONTINUED | OUTPATIENT
Start: 2023-02-05 | End: 2023-02-06

## 2023-02-05 RX ORDER — ESCITALOPRAM OXALATE 10 MG/1
20 TABLET ORAL EVERY EVENING
Status: DISCONTINUED | OUTPATIENT
Start: 2023-02-05 | End: 2023-02-06 | Stop reason: HOSPADM

## 2023-02-05 RX ORDER — HYDROCHLOROTHIAZIDE 12.5 MG/1
12.5 TABLET ORAL DAILY
Status: DISCONTINUED | OUTPATIENT
Start: 2023-02-05 | End: 2023-02-06 | Stop reason: HOSPADM

## 2023-02-05 RX ORDER — ACETAMINOPHEN 325 MG/1
650 TABLET ORAL EVERY 4 HOURS PRN
Status: DISCONTINUED | OUTPATIENT
Start: 2023-02-05 | End: 2023-02-06 | Stop reason: HOSPADM

## 2023-02-05 RX ORDER — ENOXAPARIN SODIUM 100 MG/ML
40 INJECTION SUBCUTANEOUS DAILY
Status: DISCONTINUED | OUTPATIENT
Start: 2023-02-05 | End: 2023-02-05

## 2023-02-05 RX ORDER — ASPIRIN 81 MG/1
81 TABLET, CHEWABLE ORAL DAILY
Status: DISCONTINUED | OUTPATIENT
Start: 2023-02-05 | End: 2023-02-05

## 2023-02-05 RX ORDER — TRIAMCINOLONE ACETONIDE 0.25 MG/G
1 CREAM TOPICAL 2 TIMES DAILY PRN
COMMUNITY

## 2023-02-05 RX ORDER — ESCITALOPRAM OXALATE 10 MG/1
20 TABLET ORAL DAILY
Status: DISCONTINUED | OUTPATIENT
Start: 2023-02-05 | End: 2023-02-05

## 2023-02-05 RX ORDER — NICOTINE 21 MG/24HR
1 PATCH, TRANSDERMAL 24 HOURS TRANSDERMAL DAILY
Status: DISCONTINUED | OUTPATIENT
Start: 2023-02-05 | End: 2023-02-06 | Stop reason: HOSPADM

## 2023-02-05 RX ORDER — HYDRALAZINE HYDROCHLORIDE 20 MG/ML
10 INJECTION INTRAMUSCULAR; INTRAVENOUS EVERY 6 HOURS PRN
Status: DISCONTINUED | OUTPATIENT
Start: 2023-02-05 | End: 2023-02-06 | Stop reason: HOSPADM

## 2023-02-05 RX ORDER — ONDANSETRON 2 MG/ML
4 INJECTION INTRAMUSCULAR; INTRAVENOUS 2 TIMES DAILY PRN
Status: DISCONTINUED | OUTPATIENT
Start: 2023-02-05 | End: 2023-02-06 | Stop reason: HOSPADM

## 2023-02-05 RX ADMIN — SODIUM CHLORIDE, PRESERVATIVE FREE 2 ML: 5 INJECTION INTRAVENOUS at 01:30

## 2023-02-05 RX ADMIN — POTASSIUM CHLORIDE 40 MEQ: 1500 TABLET, EXTENDED RELEASE ORAL at 09:05

## 2023-02-05 RX ADMIN — ESCITALOPRAM OXALATE 20 MG: 10 TABLET ORAL at 17:18

## 2023-02-05 RX ADMIN — METOPROLOL TARTRATE 25 MG: 25 TABLET, FILM COATED ORAL at 09:05

## 2023-02-05 RX ADMIN — ASPIRIN 81 MG CHEWABLE TABLET 81 MG: 81 TABLET CHEWABLE at 09:05

## 2023-02-05 RX ADMIN — FLECAINIDE ACETATE TABLET 50 MG: 50 TABLET ORAL at 21:09

## 2023-02-05 RX ADMIN — SODIUM CHLORIDE, PRESERVATIVE FREE 2 ML: 5 INJECTION INTRAVENOUS at 09:06

## 2023-02-05 RX ADMIN — FLECAINIDE ACETATE TABLET 50 MG: 50 TABLET ORAL at 09:05

## 2023-02-05 RX ADMIN — APIXABAN 5 MG: 5 TABLET, FILM COATED ORAL at 14:11

## 2023-02-05 RX ADMIN — SODIUM CHLORIDE, PRESERVATIVE FREE 2 ML: 5 INJECTION INTRAVENOUS at 21:10

## 2023-02-05 RX ADMIN — APIXABAN 5 MG: 5 TABLET, FILM COATED ORAL at 21:09

## 2023-02-05 RX ADMIN — ENOXAPARIN SODIUM 40 MG: 40 INJECTION SUBCUTANEOUS at 09:07

## 2023-02-05 RX ADMIN — HYDROCHLOROTHIAZIDE 12.5 MG: 12.5 TABLET ORAL at 09:05

## 2023-02-05 RX ADMIN — Medication 1 PATCH: at 09:05

## 2023-02-05 SDOH — HEALTH STABILITY: PHYSICAL HEALTH: DO YOU HAVE SERIOUS DIFFICULTY WALKING OR CLIMBING STAIRS?: NO

## 2023-02-05 SDOH — HEALTH STABILITY: PHYSICAL HEALTH: DO YOU HAVE DIFFICULTY DRESSING OR BATHING?: NO

## 2023-02-05 SDOH — ECONOMIC STABILITY: HOUSING INSECURITY: ARE YOU WORRIED ABOUT LOSING YOUR HOUSING?: NO

## 2023-02-05 SDOH — ECONOMIC STABILITY: FOOD INSECURITY: HOW OFTEN IN THE PAST 12 MONTHS WERE YOU WORRIED OR STRESSED ABOUT HAVING ENOUGH MONEY TO BUY NUTRITIOUS MEALS?: NEVER

## 2023-02-05 SDOH — HEALTH STABILITY: GENERAL
BECAUSE OF A PHYSICAL, MENTAL, OR EMOTIONAL CONDITION, DO YOU HAVE SERIOUS DIFFICULTY CONCENTRATING, REMEMBERING OR MAKING DECISIONS?: NO

## 2023-02-05 SDOH — ECONOMIC STABILITY: TRANSPORTATION INSECURITY
IN THE PAST 12 MONTHS, HAS LACK OF TRANSPORTATION KEPT YOU FROM MEETINGS, WORK, OR FROM GETTING THINGS NEEDED FOR DAILY LIVING?: NO

## 2023-02-05 SDOH — ECONOMIC STABILITY: TRANSPORTATION INSECURITY
IN THE PAST 12 MONTHS, HAS THE LACK OF TRANSPORTATION KEPT YOU FROM MEDICAL APPOINTMENTS OR FROM GETTING MEDICATIONS?: NO

## 2023-02-05 SDOH — ECONOMIC STABILITY: HOUSING INSECURITY: WHAT IS YOUR LIVING SITUATION TODAY?: HOUSE

## 2023-02-05 SDOH — HEALTH STABILITY: GENERAL: BECAUSE OF A PHYSICAL, MENTAL, OR EMOTIONAL CONDITION, DO YOU HAVE DIFFICULTY DOING ERRANDS ALONE?: NO

## 2023-02-05 SDOH — ECONOMIC STABILITY: GENERAL

## 2023-02-05 SDOH — SOCIAL STABILITY: SOCIAL NETWORK: SUPPORT SYSTEMS: CHILDREN

## 2023-02-05 SDOH — SOCIAL STABILITY: SOCIAL NETWORK
HOW OFTEN DO YOU SEE OR TALK TO PEOPLE THAT YOU CARE ABOUT AND FEEL CLOSE TO? (FOR EXAMPLE: TALKING TO FRIENDS ON THE PHONE, VISITING FRIENDS OR FAMILY, GOING TO CHURCH OR CLUB MEETINGS): 5 OR MORE TIMES A WEEK

## 2023-02-05 SDOH — ECONOMIC STABILITY: HOUSING INSECURITY: WHAT IS YOUR LIVING SITUATION TODAY?: ADULT CHILDREN

## 2023-02-05 ASSESSMENT — PATIENT HEALTH QUESTIONNAIRE - PHQ9
IS PATIENT ABLE TO COMPLETE PHQ2 OR PHQ9: YES
SUM OF ALL RESPONSES TO PHQ9 QUESTIONS 1 AND 2: 0
1. LITTLE INTEREST OR PLEASURE IN DOING THINGS: NOT AT ALL
2. FEELING DOWN, DEPRESSED OR HOPELESS: NOT AT ALL
SUM OF ALL RESPONSES TO PHQ9 QUESTIONS 1 AND 2: 0
CLINICAL INTERPRETATION OF PHQ2 SCORE: NO FURTHER SCREENING NEEDED

## 2023-02-05 ASSESSMENT — LIFESTYLE VARIABLES
HOW OFTEN DO YOU HAVE A DRINK CONTAINING ALCOHOL: 2 TO 4 TIMES PER MONTH
HOW OFTEN DO YOU HAVE 6 OR MORE DRINKS ON ONE OCCASION: NEVER
ALCOHOL_USE_STATUS: NO OR LOW RISK WITH VALIDATED TOOL
HOW MANY STANDARD DRINKS CONTAINING ALCOHOL DO YOU HAVE ON A TYPICAL DAY: 0,1 OR 2
AUDIT-C TOTAL SCORE: 2

## 2023-02-05 ASSESSMENT — ACTIVITIES OF DAILY LIVING (ADL)
ADL_BEFORE_ADMISSION: INDEPENDENT
ADL_SCORE: 12
ADL_SHORT_OF_BREATH: NO
RECENT_DECLINE_ADL: NO

## 2023-02-05 ASSESSMENT — COLUMBIA-SUICIDE SEVERITY RATING SCALE - C-SSRS
2. HAVE YOU ACTUALLY HAD ANY THOUGHTS OF KILLING YOURSELF?: NO
6. HAVE YOU EVER DONE ANYTHING, STARTED TO DO ANYTHING, OR PREPARED TO DO ANYTHING TO END YOUR LIFE?: NO
IS THE PATIENT ABLE TO COMPLETE C-SSRS: YES
1. WITHIN THE PAST MONTH, HAVE YOU WISHED YOU WERE DEAD OR WISHED YOU COULD GO TO SLEEP AND NOT WAKE UP?: NO

## 2023-02-05 ASSESSMENT — PAIN SCALES - GENERAL
PAINLEVEL_OUTOF10: 0

## 2023-02-06 ENCOUNTER — APPOINTMENT (OUTPATIENT)
Dept: CARDIOLOGY | Age: 73
End: 2023-02-06
Attending: INTERNAL MEDICINE

## 2023-02-06 VITALS
HEIGHT: 67 IN | DIASTOLIC BLOOD PRESSURE: 77 MMHG | OXYGEN SATURATION: 99 % | RESPIRATION RATE: 18 BRPM | TEMPERATURE: 98.1 F | BODY MASS INDEX: 23.46 KG/M2 | WEIGHT: 149.47 LBS | SYSTOLIC BLOOD PRESSURE: 120 MMHG | HEART RATE: 51 BPM

## 2023-02-06 LAB
ATRIAL RATE (BPM): 104
ATRIAL RATE (BPM): 109
P AXIS (DEGREES): 68
POTASSIUM SERPL-SCNC: 3.8 MMOL/L (ref 3.4–5.1)
PR-INTERVAL (MSEC): 150
QRS-INTERVAL (MSEC): 78
QRS-INTERVAL (MSEC): 90
QT-INTERVAL (MSEC): 336
QT-INTERVAL (MSEC): 350
QTC: 442
QTC: 471
R AXIS (DEGREES): -18
R AXIS (DEGREES): -9
REPORT TEXT: NORMAL
REPORT TEXT: NORMAL
STRESS TARGET HR: 146 BPM
T AXIS (DEGREES): -58
T AXIS (DEGREES): 12
VENTRICULAR RATE EKG/MIN (BPM): 104
VENTRICULAR RATE EKG/MIN (BPM): 109

## 2023-02-06 PROCEDURE — 99239 HOSP IP/OBS DSCHRG MGMT >30: CPT | Performed by: INTERNAL MEDICINE

## 2023-02-06 PROCEDURE — 93017 CV STRESS TEST TRACING ONLY: CPT

## 2023-02-06 PROCEDURE — 10002800 HB RX 250 W HCPCS: Performed by: INTERNAL MEDICINE

## 2023-02-06 PROCEDURE — 93016 CV STRESS TEST SUPVJ ONLY: CPT | Performed by: INTERNAL MEDICINE

## 2023-02-06 PROCEDURE — 10004651 HB RX, NO CHARGE ITEM: Performed by: INTERNAL MEDICINE

## 2023-02-06 PROCEDURE — A9500 TC99M SESTAMIBI: HCPCS | Performed by: INTERNAL MEDICINE

## 2023-02-06 PROCEDURE — G1004 CDSM NDSC: HCPCS

## 2023-02-06 PROCEDURE — 78452 HT MUSCLE IMAGE SPECT MULT: CPT | Performed by: INTERNAL MEDICINE

## 2023-02-06 PROCEDURE — 93018 CV STRESS TEST I&R ONLY: CPT | Performed by: INTERNAL MEDICINE

## 2023-02-06 PROCEDURE — G1004 CDSM NDSC: HCPCS | Performed by: INTERNAL MEDICINE

## 2023-02-06 PROCEDURE — 99232 SBSQ HOSP IP/OBS MODERATE 35: CPT | Performed by: NURSE PRACTITIONER

## 2023-02-06 PROCEDURE — 78452 HT MUSCLE IMAGE SPECT MULT: CPT

## 2023-02-06 PROCEDURE — 84132 ASSAY OF SERUM POTASSIUM: CPT | Performed by: INTERNAL MEDICINE

## 2023-02-06 PROCEDURE — 10002803 HB RX 637: Performed by: INTERNAL MEDICINE

## 2023-02-06 PROCEDURE — 36415 COLL VENOUS BLD VENIPUNCTURE: CPT | Performed by: INTERNAL MEDICINE

## 2023-02-06 PROCEDURE — G0378 HOSPITAL OBSERVATION PER HR: HCPCS

## 2023-02-06 PROCEDURE — 10006150 HB RX 343: Performed by: INTERNAL MEDICINE

## 2023-02-06 RX ORDER — FLECAINIDE ACETATE 50 MG/1
100 TABLET ORAL 2 TIMES DAILY
Status: DISCONTINUED | OUTPATIENT
Start: 2023-02-06 | End: 2023-02-06 | Stop reason: HOSPADM

## 2023-02-06 RX ORDER — TETRAKIS(2-METHOXYISOBUTYLISOCYANIDE)COPPER(I) TETRAFLUOROBORATE 1 MG/ML
24 INJECTION, POWDER, LYOPHILIZED, FOR SOLUTION INTRAVENOUS ONCE
Status: COMPLETED | OUTPATIENT
Start: 2023-02-06 | End: 2023-02-06

## 2023-02-06 RX ORDER — TETRAKIS(2-METHOXYISOBUTYLISOCYANIDE)COPPER(I) TETRAFLUOROBORATE 1 MG/ML
8 INJECTION, POWDER, LYOPHILIZED, FOR SOLUTION INTRAVENOUS ONCE
Status: COMPLETED | OUTPATIENT
Start: 2023-02-06 | End: 2023-02-06

## 2023-02-06 RX ORDER — REGADENOSON 0.08 MG/ML
0.4 INJECTION, SOLUTION INTRAVENOUS ONCE
Status: COMPLETED | OUTPATIENT
Start: 2023-02-06 | End: 2023-02-06

## 2023-02-06 RX ORDER — POTASSIUM CHLORIDE 20 MEQ/1
40 TABLET, EXTENDED RELEASE ORAL ONCE
Status: COMPLETED | OUTPATIENT
Start: 2023-02-06 | End: 2023-02-06

## 2023-02-06 RX ORDER — NICOTINE 21 MG/24HR
1 PATCH, TRANSDERMAL 24 HOURS TRANSDERMAL EVERY 24 HOURS
Qty: 14 EACH | Refills: 0 | Status: SHIPPED | OUTPATIENT
Start: 2023-02-06 | End: 2023-02-20

## 2023-02-06 RX ORDER — NICOTINE 21 MG/24HR
1 PATCH, TRANSDERMAL 24 HOURS TRANSDERMAL EVERY 24 HOURS
Qty: 14 PATCH | Refills: 0 | Status: SHIPPED | OUTPATIENT
Start: 2023-02-21 | End: 2023-03-01 | Stop reason: SDUPTHER

## 2023-02-06 RX ORDER — FLECAINIDE ACETATE 100 MG/1
100 TABLET ORAL 2 TIMES DAILY
Qty: 60 TABLET | Refills: 3 | Status: SHIPPED | OUTPATIENT
Start: 2023-02-06 | End: 2023-07-10 | Stop reason: DRUGHIGH

## 2023-02-06 RX ADMIN — FLECAINIDE ACETATE TABLET 50 MG: 50 TABLET ORAL at 09:48

## 2023-02-06 RX ADMIN — POTASSIUM CHLORIDE 40 MEQ: 1500 TABLET, EXTENDED RELEASE ORAL at 09:48

## 2023-02-06 RX ADMIN — SODIUM CHLORIDE, PRESERVATIVE FREE 2 ML: 5 INJECTION INTRAVENOUS at 09:52

## 2023-02-06 RX ADMIN — TETRAKIS(2-METHOXYISOBUTYLISOCYANIDE)COPPER(I) TETRAFLUOROBORATE 12 MILLICURIE: 1 INJECTION, POWDER, LYOPHILIZED, FOR SOLUTION INTRAVENOUS at 06:44

## 2023-02-06 RX ADMIN — REGADENOSON 0.4 MG: 0.08 INJECTION, SOLUTION INTRAVENOUS at 08:06

## 2023-02-06 RX ADMIN — APIXABAN 5 MG: 5 TABLET, FILM COATED ORAL at 09:48

## 2023-02-06 RX ADMIN — HYDROCHLOROTHIAZIDE 12.5 MG: 12.5 TABLET ORAL at 09:48

## 2023-02-06 RX ADMIN — METOPROLOL TARTRATE 25 MG: 25 TABLET, FILM COATED ORAL at 09:48

## 2023-02-06 RX ADMIN — TETRAKIS(2-METHOXYISOBUTYLISOCYANIDE)COPPER(I) TETRAFLUOROBORATE 30 MILLICURIE: 1 INJECTION, POWDER, LYOPHILIZED, FOR SOLUTION INTRAVENOUS at 08:06

## 2023-02-06 RX ADMIN — Medication 1 PATCH: at 09:48

## 2023-02-06 ASSESSMENT — COGNITIVE AND FUNCTIONAL STATUS - GENERAL
BECAUSE OF A PHYSICAL, MENTAL, OR EMOTIONAL CONDITION, DO YOU HAVE DIFFICULTY DOING ERRANDS ALONE: NO
DO YOU HAVE DIFFICULTY DRESSING OR BATHING: NO
DO YOU HAVE SERIOUS DIFFICULTY WALKING OR CLIMBING STAIRS: NO
BECAUSE OF A PHYSICAL, MENTAL, OR EMOTIONAL CONDITION, DO YOU HAVE SERIOUS DIFFICULTY CONCENTRATING, REMEMBERING OR MAKING DECISIONS: NO

## 2023-02-06 ASSESSMENT — PAIN SCALES - GENERAL: PAINLEVEL_OUTOF10: 0

## 2023-02-27 ENCOUNTER — TELEPHONE (OUTPATIENT)
Dept: CARDIOLOGY | Age: 73
End: 2023-02-27

## 2023-03-01 ENCOUNTER — OFFICE VISIT (OUTPATIENT)
Dept: CARDIOLOGY | Age: 73
End: 2023-03-01

## 2023-03-01 ENCOUNTER — APPOINTMENT (OUTPATIENT)
Dept: CARDIOLOGY | Age: 73
End: 2023-03-01

## 2023-03-01 VITALS
DIASTOLIC BLOOD PRESSURE: 78 MMHG | HEART RATE: 65 BPM | BODY MASS INDEX: 24.12 KG/M2 | WEIGHT: 153.99 LBS | SYSTOLIC BLOOD PRESSURE: 126 MMHG

## 2023-03-01 DIAGNOSIS — I48.0 PAROXYSMAL ATRIAL FIBRILLATION (CMD): ICD-10-CM

## 2023-03-01 DIAGNOSIS — Z09 HOSPITAL DISCHARGE FOLLOW-UP: Primary | ICD-10-CM

## 2023-03-01 DIAGNOSIS — F17.210 SMOKES WITH GREATER THAN 30 PACK YEAR HISTORY: ICD-10-CM

## 2023-03-01 PROCEDURE — 93000 ELECTROCARDIOGRAM COMPLETE: CPT | Performed by: NURSE PRACTITIONER

## 2023-03-01 PROCEDURE — 99214 OFFICE O/P EST MOD 30 MIN: CPT | Performed by: NURSE PRACTITIONER

## 2023-03-01 RX ORDER — NICOTINE 21 MG/24HR
1 PATCH, TRANSDERMAL 24 HOURS TRANSDERMAL EVERY 24 HOURS
COMMUNITY

## 2023-03-06 ENCOUNTER — OFFICE VISIT (OUTPATIENT)
Dept: FAMILY MEDICINE | Age: 73
End: 2023-03-06

## 2023-03-06 VITALS
HEART RATE: 56 BPM | TEMPERATURE: 97.1 F | RESPIRATION RATE: 16 BRPM | SYSTOLIC BLOOD PRESSURE: 114 MMHG | OXYGEN SATURATION: 98 % | BODY MASS INDEX: 24.91 KG/M2 | WEIGHT: 155 LBS | DIASTOLIC BLOOD PRESSURE: 76 MMHG | HEIGHT: 66 IN

## 2023-03-06 DIAGNOSIS — I48.0 PAROXYSMAL ATRIAL FIBRILLATION (CMD): Primary | ICD-10-CM

## 2023-03-06 DIAGNOSIS — J34.89 SORE IN NOSTRIL: ICD-10-CM

## 2023-03-06 DIAGNOSIS — I10 PRIMARY HYPERTENSION: ICD-10-CM

## 2023-03-06 DIAGNOSIS — E03.8 SUBCLINICAL HYPOTHYROIDISM: ICD-10-CM

## 2023-03-06 DIAGNOSIS — F17.210 SMOKES WITH GREATER THAN 30 PACK YEAR HISTORY: ICD-10-CM

## 2023-03-06 DIAGNOSIS — Z09 HOSPITAL DISCHARGE FOLLOW-UP: ICD-10-CM

## 2023-03-06 PROCEDURE — 99214 OFFICE O/P EST MOD 30 MIN: CPT | Performed by: FAMILY MEDICINE

## 2023-03-06 ASSESSMENT — PATIENT HEALTH QUESTIONNAIRE - PHQ9
SUM OF ALL RESPONSES TO PHQ9 QUESTIONS 1 AND 2: 0
SUM OF ALL RESPONSES TO PHQ9 QUESTIONS 1 AND 2: 0
CLINICAL INTERPRETATION OF PHQ2 SCORE: NO FURTHER SCREENING NEEDED
1. LITTLE INTEREST OR PLEASURE IN DOING THINGS: NOT AT ALL
2. FEELING DOWN, DEPRESSED OR HOPELESS: NOT AT ALL

## 2023-03-06 ASSESSMENT — PAIN SCALES - GENERAL: PAINLEVEL: 1

## 2023-05-03 ENCOUNTER — TELEPHONE (OUTPATIENT)
Dept: FAMILY MEDICINE | Age: 73
End: 2023-05-03

## 2023-05-03 DIAGNOSIS — R10.11 RUQ PAIN: Primary | ICD-10-CM

## 2023-05-04 ENCOUNTER — TELEPHONE (OUTPATIENT)
Dept: FAMILY MEDICINE | Age: 73
End: 2023-05-04

## 2023-05-22 ENCOUNTER — APPOINTMENT (OUTPATIENT)
Dept: ULTRASOUND IMAGING | Age: 73
End: 2023-05-22
Attending: FAMILY MEDICINE

## 2023-05-24 ENCOUNTER — APPOINTMENT (OUTPATIENT)
Dept: CARDIOLOGY | Age: 73
End: 2023-05-24
Attending: INTERNAL MEDICINE

## 2023-06-07 ENCOUNTER — CLINICAL ABSTRACT (OUTPATIENT)
Dept: INFUSION THERAPY | Age: 73
End: 2023-06-07

## 2023-06-07 ENCOUNTER — TELEPHONE (OUTPATIENT)
Dept: CARDIOLOGY | Age: 73
End: 2023-06-07

## 2023-06-16 ENCOUNTER — HOSPITAL ENCOUNTER (OUTPATIENT)
Dept: ULTRASOUND IMAGING | Age: 73
Discharge: HOME | End: 2023-06-16
Attending: FAMILY MEDICINE

## 2023-06-16 DIAGNOSIS — R10.11 RUQ PAIN: ICD-10-CM

## 2023-06-16 PROCEDURE — 76700 US EXAM ABDOM COMPLETE: CPT

## 2023-06-17 ENCOUNTER — TELEPHONE (OUTPATIENT)
Dept: FAMILY MEDICINE | Age: 73
End: 2023-06-17

## 2023-07-07 ENCOUNTER — HOSPITAL ENCOUNTER (OUTPATIENT)
Dept: GENERAL RADIOLOGY | Age: 73
End: 2023-07-07
Attending: INTERNAL MEDICINE

## 2023-07-10 RX ORDER — FLECAINIDE ACETATE 100 MG/1
100 TABLET ORAL 2 TIMES DAILY
Qty: 60 TABLET | Refills: 2 | Status: SHIPPED | OUTPATIENT
Start: 2023-07-10 | End: 2023-09-26

## 2023-07-17 ENCOUNTER — LAB SERVICES (OUTPATIENT)
Dept: LAB | Age: 73
End: 2023-07-17

## 2023-07-17 ENCOUNTER — HOSPITAL ENCOUNTER (OUTPATIENT)
Dept: GENERAL RADIOLOGY | Age: 73
Discharge: HOME | End: 2023-07-17
Attending: INTERNAL MEDICINE

## 2023-07-17 DIAGNOSIS — M81.0 SENILE OSTEOPOROSIS: ICD-10-CM

## 2023-07-17 PROCEDURE — 77080 DXA BONE DENSITY AXIAL: CPT

## 2023-07-19 ENCOUNTER — CLINICAL ABSTRACT (OUTPATIENT)
Dept: INFUSION THERAPY | Age: 73
End: 2023-07-19

## 2023-07-20 ENCOUNTER — CLINICAL ABSTRACT (OUTPATIENT)
Dept: INFUSION THERAPY | Age: 73
End: 2023-07-20

## 2023-07-26 ENCOUNTER — CLINICAL ABSTRACT (OUTPATIENT)
Dept: INFUSION THERAPY | Age: 73
End: 2023-07-26

## 2023-08-09 ENCOUNTER — APPOINTMENT (OUTPATIENT)
Dept: INFUSION THERAPY | Age: 73
End: 2023-08-09
Attending: INTERNAL MEDICINE

## 2023-08-15 ENCOUNTER — HOSPITAL ENCOUNTER (OUTPATIENT)
Dept: INFUSION THERAPY | Age: 73
Discharge: STILL ADMITTED OR ADMINISTRATIVE | End: 2023-08-15
Attending: INTERNAL MEDICINE

## 2023-08-15 VITALS
RESPIRATION RATE: 16 BRPM | HEART RATE: 54 BPM | SYSTOLIC BLOOD PRESSURE: 103 MMHG | DIASTOLIC BLOOD PRESSURE: 70 MMHG | OXYGEN SATURATION: 96 % | TEMPERATURE: 98.4 F

## 2023-08-15 DIAGNOSIS — M81.0 SENILE OSTEOPOROSIS: Primary | ICD-10-CM

## 2023-08-15 PROCEDURE — 10002800 HB RX 250 W HCPCS: Performed by: INTERNAL MEDICINE

## 2023-08-15 PROCEDURE — 96372 THER/PROPH/DIAG INJ SC/IM: CPT | Performed by: INTERNAL MEDICINE

## 2023-08-15 RX ADMIN — DENOSUMAB 60 MG: 60 INJECTION SUBCUTANEOUS at 14:16

## 2023-08-15 ASSESSMENT — PAIN SCALES - GENERAL: PAINLEVEL_OUTOF10: 0

## 2023-09-20 DIAGNOSIS — F41.9 ANXIETY: ICD-10-CM

## 2023-09-20 RX ORDER — ESCITALOPRAM OXALATE 20 MG/1
20 TABLET ORAL DAILY
Qty: 90 TABLET | Refills: 1 | Status: SHIPPED | OUTPATIENT
Start: 2023-09-20

## 2023-09-26 RX ORDER — FLECAINIDE ACETATE 100 MG/1
100 TABLET ORAL 2 TIMES DAILY
Qty: 60 TABLET | Refills: 0 | Status: SHIPPED | OUTPATIENT
Start: 2023-09-26 | End: 2023-10-26

## 2023-10-26 RX ORDER — FLECAINIDE ACETATE 100 MG/1
100 TABLET ORAL 2 TIMES DAILY
Qty: 60 TABLET | Refills: 1 | Status: SHIPPED | OUTPATIENT
Start: 2023-10-26 | End: 2023-12-12

## 2023-12-12 ENCOUNTER — TELEPHONE (OUTPATIENT)
Dept: CARDIOLOGY | Age: 73
End: 2023-12-12

## 2023-12-12 DIAGNOSIS — I48.0 PAROXYSMAL ATRIAL FIBRILLATION (CMD): Primary | ICD-10-CM

## 2023-12-12 RX ORDER — FLECAINIDE ACETATE 100 MG/1
100 TABLET ORAL 2 TIMES DAILY
Qty: 60 TABLET | Refills: 0 | Status: SHIPPED | OUTPATIENT
Start: 2023-12-12

## 2023-12-14 ENCOUNTER — EXTERNAL RECORD (OUTPATIENT)
Dept: HEALTH INFORMATION MANAGEMENT | Facility: OTHER | Age: 73
End: 2023-12-14

## 2023-12-14 ENCOUNTER — ANCILLARY PROCEDURE (OUTPATIENT)
Dept: CARDIOLOGY | Age: 73
End: 2023-12-14
Attending: INTERNAL MEDICINE

## 2023-12-14 DIAGNOSIS — I48.0 PAROXYSMAL ATRIAL FIBRILLATION (CMD): ICD-10-CM

## 2023-12-28 RX ORDER — FLECAINIDE ACETATE 100 MG/1
100 TABLET ORAL 2 TIMES DAILY
Qty: 180 TABLET | Refills: 1 | Status: SHIPPED | OUTPATIENT
Start: 2023-12-28

## 2024-01-22 ENCOUNTER — TELEPHONE (OUTPATIENT)
Dept: FAMILY MEDICINE | Age: 74
End: 2024-01-22

## 2024-01-22 DIAGNOSIS — Z87.891 PERSONAL HISTORY OF SMOKING: ICD-10-CM

## 2024-01-22 DIAGNOSIS — Z12.2 SCREENING FOR LUNG CANCER: ICD-10-CM

## 2024-01-22 DIAGNOSIS — Z12.11 SCREEN FOR COLON CANCER: ICD-10-CM

## 2024-01-22 DIAGNOSIS — Z12.31 VISIT FOR SCREENING MAMMOGRAM: Primary | ICD-10-CM

## 2024-01-23 ENCOUNTER — TELEPHONE (OUTPATIENT)
Dept: FAMILY MEDICINE | Age: 74
End: 2024-01-23

## 2024-01-23 DIAGNOSIS — N63.0 PALPABLE MASS OF BREAST: Primary | ICD-10-CM

## 2024-01-25 ENCOUNTER — HOSPITAL ENCOUNTER (OUTPATIENT)
Dept: CT IMAGING | Age: 74
Discharge: HOME | End: 2024-01-25
Attending: FAMILY MEDICINE

## 2024-01-25 DIAGNOSIS — N63.21 MASS OF UPPER OUTER QUADRANT OF LEFT BREAST: ICD-10-CM

## 2024-01-25 DIAGNOSIS — N63.0 PALPABLE MASS OF BREAST: ICD-10-CM

## 2024-01-25 PROCEDURE — 76642 ULTRASOUND BREAST LIMITED: CPT

## 2024-01-25 PROCEDURE — G0279 TOMOSYNTHESIS, MAMMO: HCPCS

## 2024-02-05 ENCOUNTER — APPOINTMENT (OUTPATIENT)
Dept: CT IMAGING | Age: 74
End: 2024-02-05
Attending: FAMILY MEDICINE

## 2024-02-06 ENCOUNTER — HOSPITAL ENCOUNTER (OUTPATIENT)
Dept: CT IMAGING | Age: 74
Discharge: HOME | End: 2024-02-06
Attending: FAMILY MEDICINE

## 2024-02-06 DIAGNOSIS — Z12.2 SCREENING FOR LUNG CANCER: ICD-10-CM

## 2024-02-06 DIAGNOSIS — Z87.891 PERSONAL HISTORY OF SMOKING: ICD-10-CM

## 2024-02-06 PROCEDURE — 71271 CT THORAX LUNG CANCER SCR C-: CPT

## 2024-02-12 ENCOUNTER — TELEPHONE (OUTPATIENT)
Dept: FAMILY MEDICINE | Age: 74
End: 2024-02-12

## 2024-02-21 ENCOUNTER — LAB SERVICES (OUTPATIENT)
Dept: LAB | Age: 74
End: 2024-02-21

## 2024-02-21 DIAGNOSIS — M81.0 OSTEOPOROSIS: Primary | ICD-10-CM

## 2024-02-21 LAB
ALBUMIN SERPL-MCNC: 3.6 G/DL (ref 3.6–5.1)
ALBUMIN/GLOB SERPL: 1.2 {RATIO} (ref 1–2.4)
ALP SERPL-CCNC: 63 UNITS/L (ref 45–117)
ALT SERPL-CCNC: 15 UNITS/L
ANION GAP SERPL CALC-SCNC: 7 MMOL/L (ref 7–19)
AST SERPL-CCNC: 14 UNITS/L
BILIRUB SERPL-MCNC: 0.4 MG/DL (ref 0.2–1)
BUN SERPL-MCNC: 12 MG/DL (ref 6–20)
BUN/CREAT SERPL: 17 (ref 7–25)
CALCIUM SERPL-MCNC: 9.6 MG/DL (ref 8.4–10.2)
CHLORIDE SERPL-SCNC: 106 MMOL/L (ref 97–110)
CO2 SERPL-SCNC: 29 MMOL/L (ref 21–32)
CREAT SERPL-MCNC: 0.7 MG/DL (ref 0.51–0.95)
EGFRCR SERPLBLD CKD-EPI 2021: 90 ML/MIN/{1.73_M2}
FASTING DURATION TIME PATIENT: NORMAL H
GLOBULIN SER-MCNC: 3.1 G/DL (ref 2–4)
GLUCOSE SERPL-MCNC: 83 MG/DL (ref 70–99)
POTASSIUM SERPL-SCNC: 4.1 MMOL/L (ref 3.4–5.1)
PROT SERPL-MCNC: 6.7 G/DL (ref 6.4–8.2)
SODIUM SERPL-SCNC: 138 MMOL/L (ref 135–145)

## 2024-02-21 PROCEDURE — 36415 COLL VENOUS BLD VENIPUNCTURE: CPT

## 2024-02-21 PROCEDURE — 80053 COMPREHEN METABOLIC PANEL: CPT

## 2024-02-22 ENCOUNTER — HOSPITAL ENCOUNTER (OUTPATIENT)
Dept: INFUSION THERAPY | Age: 74
Discharge: STILL ADMITTED OR ADMINISTRATIVE | End: 2024-02-22
Attending: INTERNAL MEDICINE

## 2024-02-22 VITALS
HEART RATE: 54 BPM | OXYGEN SATURATION: 96 % | RESPIRATION RATE: 16 BRPM | TEMPERATURE: 98.2 F | SYSTOLIC BLOOD PRESSURE: 102 MMHG | DIASTOLIC BLOOD PRESSURE: 66 MMHG

## 2024-02-22 DIAGNOSIS — M81.0 SENILE OSTEOPOROSIS: Primary | ICD-10-CM

## 2024-02-22 PROCEDURE — 10002800 HB RX 250 W HCPCS: Performed by: INTERNAL MEDICINE

## 2024-02-22 PROCEDURE — 96372 THER/PROPH/DIAG INJ SC/IM: CPT | Performed by: INTERNAL MEDICINE

## 2024-02-22 RX ADMIN — DENOSUMAB 60 MG: 60 INJECTION SUBCUTANEOUS at 15:19

## 2024-02-22 ASSESSMENT — PAIN SCALES - GENERAL: PAINLEVEL_OUTOF10: 0

## 2024-03-13 DIAGNOSIS — F41.9 ANXIETY: ICD-10-CM

## 2024-03-13 RX ORDER — ESCITALOPRAM OXALATE 20 MG/1
20 TABLET ORAL DAILY
Qty: 90 TABLET | Refills: 0 | Status: SHIPPED | OUTPATIENT
Start: 2024-03-13

## 2024-05-08 ENCOUNTER — OFFICE VISIT (OUTPATIENT)
Dept: SURGERY | Facility: CLINIC | Age: 74
End: 2024-05-08
Payer: MEDICARE

## 2024-05-08 VITALS
SYSTOLIC BLOOD PRESSURE: 122 MMHG | HEART RATE: 57 BPM | BODY MASS INDEX: 24.27 KG/M2 | DIASTOLIC BLOOD PRESSURE: 69 MMHG | WEIGHT: 151 LBS | HEIGHT: 66 IN

## 2024-05-08 DIAGNOSIS — G89.29 CHRONIC BILATERAL THORACIC BACK PAIN: ICD-10-CM

## 2024-05-08 DIAGNOSIS — M54.42 CHRONIC BILATERAL LOW BACK PAIN WITH BILATERAL SCIATICA: Primary | ICD-10-CM

## 2024-05-08 DIAGNOSIS — M54.41 CHRONIC BILATERAL LOW BACK PAIN WITH BILATERAL SCIATICA: Primary | ICD-10-CM

## 2024-05-08 DIAGNOSIS — Z87.81 HISTORY OF COMPRESSION FRACTURE OF SPINE: ICD-10-CM

## 2024-05-08 DIAGNOSIS — G89.29 CHRONIC BILATERAL LOW BACK PAIN WITH BILATERAL SCIATICA: Primary | ICD-10-CM

## 2024-05-08 DIAGNOSIS — M54.6 CHRONIC BILATERAL THORACIC BACK PAIN: ICD-10-CM

## 2024-05-08 PROCEDURE — 99203 OFFICE O/P NEW LOW 30 MIN: CPT | Performed by: STUDENT IN AN ORGANIZED HEALTH CARE EDUCATION/TRAINING PROGRAM

## 2024-05-08 NOTE — PROGRESS NOTES
Physical Therapy / Injections: Patient denies completing any recent Physical Therapy / Injections  Numbness / Tingling: Patient reports numbness and tingling on both hands and right foot.    Pain Level: 3/10. Patient states that they are having pain located on their lower.

## 2024-05-08 NOTE — PROGRESS NOTES
New Neurosurgery Patient    Patient: Antonia Gonzalez  Medical Record Number: [id number]  YOB: 1950  PCP: Fabi Blackwell MD  Referring Provider: No ref. provider found.     Reason for visit: Back pain    HISTORY OF PRESENTING ILLNESS:  I had the pleasure of meeting Antonia Gonzalez in the neurosurgery clinic today. Antonia Gonzalez is a pleasant 73 year old female who presents today with gradually worsening chronic low back pain.  The patient reports that approximately 5 to 6 years ago, she developed acute onset of back pain..  She denies any trauma or inciting events.  She had a lumbar MRI in 2019 that demonstrated acute compression fractures at L3, L4, and L5 without obvious retropulsion.  She states that she saw neurosurgeon at that time, who recommended a kyphoplasty procedure.  She elected not to undergo the procedure and eventually her pain got better.  Within the last few weeks, her pain has gradually worsened.  Again, denies any trauma or inciting events.  She localizes the pain to her thoracolumbar spine, bilaterally.  She states that when she ambulates or stands for long time, the pain radiates into her hips bilaterally.  The pain never extends distal to the hips.  She denies any lower extremity numbness, tingling, or weakness.  She does not have any bowel/bladder incontinence or saddle anesthesia.  She states that occasionally, her low back will feel numb.  She feels off balance at the hip sometimes, but has not had any falls.  She has not tried any treatments.  She has not tried ice, heat, or pain medications.  She has never had physical therapy or injections of her low back.  She states that recently, the distances that she has been able to ambulate without pain or less.  She sees endocrinology for diagnosed osteoporosis and is on Prolia injections.  She denies any neck pain or radicular upper extremity pain or weakness.  No urinary urgency or trouble with manual dexterity.  She states that  intermittently, her hands will fall asleep at night, but she is able to shake them out with good relief.    Past Medical History:    Arrhythmia      No past surgical history on file.   No family history on file.   Social History     Socioeconomic History    Marital status:    Tobacco Use    Smoking status: Every Day     Types: Cigarettes    Smokeless tobacco: Never      Allergies   Allergen Reactions    Augmentin [Amoxicillin-Pot Clavulanate] RASH    Ibuprofen SWELLING      Current Medications:  Current Outpatient Medications   Medication Sig Dispense Refill    Sulfamethoxazole-TMP DS (BACTRIM DS) 800-160 MG Oral Tab per tablet Take 1 tab PO BID x 2 weeks. Take with food. 28 tablet 0    mupirocin 2 % External Ointment Apply to both nostrils BID x 2 weeks 15 g 1    escitalopram 10 MG Oral Tab   4    Flecainide Acetate 50 MG Oral Tab   4    metoprolol Tartrate 25 MG Oral Tab   4        REVIEW OF SYSTEMS:  Comprehensive review of systems completed and negative with the exception of aforementioned information in the HPI.     PHYSICAL EXAM:  /69   Pulse 57   Ht 66\"   Wt 151 lb (68.5 kg)   BMI 24.37 kg/m²   Body mass index is 24.37 kg/m².  Wt Readings from Last 6 Encounters:   05/08/24 151 lb (68.5 kg)        General: Well developed, well nourished, in no acute distress. Ambulates without assistance.    HEENT: Normocephalic, atraumatic.    Respirations: Non-labored     Neurologic / Musculoskeletal: Awake, alert, and interactive. Recent and remote memory appear intact. Attention span and concentration are appropriate. No dysarthria. Coordination and motor control grossly intact. Patient follows commands briskly and appropriately.     Cervical Spine:    Palpation: Spine and paraspinal muscles nontender    Motor/ Extremities   Deltoid Biceps Triceps    Sensation   R 5/5 5/5 5/5 5/5 Intact to light touch   L 5/5 5/5 5/5 5/5 Intact to light touch     Thoracic spine:    Palpation: Spine and paraspinal  muscles nontender    Lumbar Spine:    Palpation: Spine and paraspinal muscles nontender    Motor / Extremities   Hip   flexion Knee   extension Dorsiflexion EHL Plantarflexion   Sensation   R 5/5 5/5 5/5 5/5 5/5 Intact to light touch   L 5/5 5/5 5/5 5/5 5/5 Intact to light touch     Reflexes:  -Biceps: 2+ and equal bilaterally  -Triceps: 1+ and equal bilaterally  -Simmons's Sign: Negative  -Patellar: 1+ and equal bilaterally  -Ankle: 0 and equal bilaterally  -Clonus: Negative      IMAGING:  No images available.  [facility] in care everywhere.    CONCLUSION:     Acute fractures of the L3, L4 and L5 vertebral bodies with approximately 50% loss of height of the L5 vertebral body .  No osseous retropulsion.     Degenerative disc and facet disease throughout the lumbar spine, most prominent at L3-L4 and L4-L5, where there is moderate narrowing of the canal and bilateral neural foramen.     Asymmetric marrow edema within the bilateral L4 and L5 pedicles suggestive of mechanical stress changes from altered biomechanics.     Multilevel degenerative spondylolisthesis as described.        Dictated by (CST): Ky Montague MD on 5/29/2019 at 18:55       Approved by (CST): Ky Montague MD on 5/29/2019 at 19:00         ASSESSMENT / PLAN:    ICD-10-CM   1. Chronic bilateral low back pain with bilateral sciatica  M54.42    M54.41    G89.29      2. Chronic bilateral thoracic back pain  M54.6    G89.29      3. History of compression fracture of spine  Z87.81                Patient is a 73 year old female with a pertinent medical history of lumbar compression fractures per patient history, who presents to the office today with gradual worsening of back pain that radiates into her hips bilaterally.  She is neurologically intact on examination.  No tenderness to palpation over her spine or paraspinal muscles.  Reviewed the nuances of compression fractures and  management as below.  Will plan to obtain x-rays of the thoracolumbar spine to rule out new compression fractures.  She is working on obtaining outside medical records to determine where her previously diagnosed compression fractures are located.  She has not done any conservative management and therefore, we will set her up with physical therapy and recommended she trial OTC pain medications for relief.  If there is report of a fracture that was not present on her prior documentation, she may benefit from an MRI of the lumbar spine or thoracic spine without contrast to assess the chronicity of the fracture.  If her pain continues to persist despite medical management, then she may benefit from an IR referral to consider a kyphoplasty procedure.  We discussed that this is all on the presumption that there is a new fracture.  I explained that this all may be secondary to musculoskeletal pain should her imaging be unrevealing.  I counseled her extensively on the importance of quitting smoking.  She states that she has patches, but does not use them.  She is aware of the risks of smoking as relates to bone quality and other comorbidities.    The patient has a reported history of osteoporotic compression fractures. Fracture morphology is generally stable.    The patient may ambulate as tolerated. Bedrest is not indicated, and should be avoided.  Routine bracing of compression fractures is not supported by the existing clinical literature.    Bracing may be considered on an individual basis, for pain control. However, this should be done with the understanding that bracing carries some risk of complications, including skin breakdown and muscle atrophy.    In select cases, vertebral augmentation, such as vertebroplasty or kyphoplasty, may be  considered for refractory pain. At this point, I do not think this is indicated.    This patient should continue treatment for her osteoporosis at the discretion of her  endocrinologist.      -Medications Prescribed: None.  Recommended Tylenol, NSAIDs, and other OTC pain medications  -Imaging Ordered: XR thoracic/lumbar spine AP/lateral  -Referrals Placed: Physical therapy  -Follow up: After imaging, therapy, and retrieval of outside medical records  -Continue follow-up with endocrine as recommended by their service  -Smoking cessation     Plan was reviewed and discussed in detail with the patient. Patient encouraged to call the office with any questions or concerns of new/worsening neurologic symptoms. Patient demonstrated good understanding and was agreeable with the plan.     Visit time: 40 minutes   Over 50% of that time was spent providing patient education and discussing care plan.    Drew Crain PA-C  Physician Assistant- Neurosurgery   [facility]  5/8/2024, 3:00 PM

## 2024-05-08 NOTE — PATIENT INSTRUCTIONS
Refill policies:    Allow 2-3 business days for refills; controlled substances may take longer.  Contact your pharmacy at least 5 days prior to running out of medication and have them send an electronic request or submit request through the “request refill” option in your [employer] account.  Refills are not addressed on weekends; covering physicians do not authorize routine medications on weekends.  No narcotics or controlled substances are refilled after noon on Fridays or by on call physicians.  By law, narcotics must be electronically prescribed.  A 30 day supply with no refills is the maximum allowed.  If your prescription is due for a refill, you may be due for a follow up appointment.  To best provide you care, patients receiving routine medications need to be seen at least once a year.  Patients receiving narcotic/controlled substance medications need to be seen at least once every 3 months.  In the event that your preferred pharmacy does not have the requested medication in stock (e.g. Backordered), it is your responsibility to find another pharmacy that has the requested medication available.  We will gladly send a new prescription to that pharmacy at your request.    Scheduling Tests:    If your physician has ordered radiology tests such as MRI or CT scans, please contact Central Scheduling at [phone] right away to schedule the test.  Once scheduled, the [facility] Centralized Referral Team will work with your insurance carrier to obtain pre-certification or prior authorization.  Depending on your insurance carrier, approval may take 3-10 days.  It is highly recommended patients assure they have received an authorization before having a test performed.  If test is done without insurance authorization, patient may be responsible for the entire amount billed.      Precertification and Prior Authorizations:  If your physician has recommended that you have a procedure or additional testing performed the [facility]  Centralized Referral Team will contact your insurance carrier to obtain pre-certification or prior authorization.    You are strongly encouraged to contact your insurance carrier to verify that your procedure/test has been approved and is a COVERED benefit.  Although the [facility] Centralized Referral Team does its due diligence, the insurance carrier gives the disclaimer that \"Although the procedure is authorized, this does not guarantee payment.\"    Ultimately the patient is responsible for payment.   Thank you for your understanding in this matter.  Paperwork Completion:  If you require FMLA or disability paperwork for your recovery, please make sure to either drop it off or have it faxed to our office at [phone]. Be sure the form has your name and date of birth on it.  The form will be faxed to our Forms Department and they will complete it for you.  There is a 25$ fee for all forms that need to be filled out.  Please be aware there is a 10-14 day turnaround time.  You will need to sign a release of information (SAMMY) form if your paperwork does not come with one.  You may call the Forms Department with any questions at [phone].  Their fax number is [phone].

## 2024-05-09 ENCOUNTER — HOSPITAL ENCOUNTER (OUTPATIENT)
Dept: GENERAL RADIOLOGY | Age: 74
Discharge: HOME | End: 2024-05-09
Attending: STUDENT IN AN ORGANIZED HEALTH CARE EDUCATION/TRAINING PROGRAM
Payer: MEDICARE

## 2024-05-09 DIAGNOSIS — M54.6 CHRONIC BILATERAL THORACIC BACK PAIN: ICD-10-CM

## 2024-05-09 DIAGNOSIS — M54.41 CHRONIC BILATERAL LOW BACK PAIN WITH BILATERAL SCIATICA: ICD-10-CM

## 2024-05-09 DIAGNOSIS — Z87.81 HISTORY OF COMPRESSION FRACTURE OF SPINE: ICD-10-CM

## 2024-05-09 DIAGNOSIS — G89.29 CHRONIC BILATERAL THORACIC BACK PAIN: ICD-10-CM

## 2024-05-09 DIAGNOSIS — M54.42 CHRONIC BILATERAL LOW BACK PAIN WITH BILATERAL SCIATICA: ICD-10-CM

## 2024-05-09 DIAGNOSIS — G89.29 CHRONIC BILATERAL LOW BACK PAIN WITH BILATERAL SCIATICA: ICD-10-CM

## 2024-05-09 PROCEDURE — 72100 X-RAY EXAM L-S SPINE 2/3 VWS: CPT | Performed by: STUDENT IN AN ORGANIZED HEALTH CARE EDUCATION/TRAINING PROGRAM

## 2024-05-09 PROCEDURE — 72072 X-RAY EXAM THORAC SPINE 3VWS: CPT | Performed by: STUDENT IN AN ORGANIZED HEALTH CARE EDUCATION/TRAINING PROGRAM

## 2024-05-15 RX ORDER — APIXABAN 5 MG/1
5 TABLET, FILM COATED ORAL EVERY 12 HOURS
Qty: 180 TABLET | Refills: 0 | Status: SHIPPED | OUTPATIENT
Start: 2024-05-15

## 2024-05-17 ENCOUNTER — E-ADVICE (OUTPATIENT)
Dept: FAMILY MEDICINE | Age: 74
End: 2024-05-17

## 2024-05-22 ENCOUNTER — APPOINTMENT (OUTPATIENT)
Dept: CARDIOLOGY | Age: 74
End: 2024-05-22

## 2024-06-11 DIAGNOSIS — F41.9 ANXIETY: ICD-10-CM

## 2024-06-11 RX ORDER — ESCITALOPRAM OXALATE 20 MG/1
20 TABLET ORAL DAILY
Qty: 30 TABLET | Refills: 0 | Status: SHIPPED | OUTPATIENT
Start: 2024-06-11

## 2024-06-19 ENCOUNTER — APPOINTMENT (OUTPATIENT)
Dept: CARDIOLOGY | Age: 74
End: 2024-06-19

## 2024-06-19 VITALS
HEART RATE: 55 BPM | HEIGHT: 65 IN | WEIGHT: 153.22 LBS | SYSTOLIC BLOOD PRESSURE: 95 MMHG | BODY MASS INDEX: 25.53 KG/M2 | DIASTOLIC BLOOD PRESSURE: 51 MMHG

## 2024-06-19 DIAGNOSIS — Z79.899 ENCOUNTER FOR MONITORING FLECAINIDE THERAPY: Primary | ICD-10-CM

## 2024-06-19 DIAGNOSIS — Z51.81 ENCOUNTER FOR MONITORING FLECAINIDE THERAPY: Primary | ICD-10-CM

## 2024-06-19 DIAGNOSIS — I48.0 PAROXYSMAL ATRIAL FIBRILLATION  (CMD): ICD-10-CM

## 2024-06-19 LAB
ATRIAL RATE (BPM): 55
P AXIS (DEGREES): 71
PR-INTERVAL (MSEC): 186
QRS-INTERVAL (MSEC): 86
QT-INTERVAL (MSEC): 426
QTC: 407
R AXIS (DEGREES): 114
REPORT TEXT: NORMAL
T AXIS (DEGREES): 26
VENTRICULAR RATE EKG/MIN (BPM): 55

## 2024-06-19 SDOH — HEALTH STABILITY: MENTAL HEALTH: DEPRESSION SCREENING SCORE: 0

## 2024-06-19 SDOH — HEALTH STABILITY: MENTAL HEALTH: LITTLE INTEREST OR PLEASURE IN ACTIVITY?: NOT AT ALL

## 2024-06-19 SDOH — HEALTH STABILITY: PHYSICAL HEALTH: ON AVERAGE, HOW MANY DAYS PER WEEK DO YOU ENGAGE IN MODERATE TO STRENUOUS EXERCISE (LIKE A BRISK WALK)?: 0 DAYS

## 2024-06-19 SDOH — HEALTH STABILITY: MENTAL HEALTH: PHQ2 INTERPRETATION: NO FURTHER SCREENING NEEDED

## 2024-06-19 SDOH — HEALTH STABILITY: PHYSICAL HEALTH: ON AVERAGE, HOW MANY MINUTES DO YOU ENGAGE IN EXERCISE AT THIS LEVEL?: 0 MIN

## 2024-06-19 SDOH — HEALTH STABILITY: MENTAL HEALTH: FEELING DOWN, DEPRESSED OR HOPELESS?: NOT AT ALL

## 2024-06-19 ASSESSMENT — PATIENT HEALTH QUESTIONNAIRE - PHQ9: SUM OF ALL RESPONSES TO PHQ9 QUESTIONS 1 AND 2: 0

## 2024-07-01 ENCOUNTER — TELEPHONE (OUTPATIENT)
Dept: SURGERY | Facility: CLINIC | Age: 74
End: 2024-07-01

## 2024-07-01 NOTE — TELEPHONE ENCOUNTER
Patient came in office today and dropped off medical reports. Attached was, EMG study with Constantin Valle MD dated 12/5/2018 from [facility].     MRI Lumbar Spine WO/W Con written report and disc dated 10/17/2018    NM Bone Imaging Whole Body     XR DEXA Scan Axial written report and disc dated 11/14/2018    MRI Cervical Spine WO Con written report and disc dated 12/5/2018    CT Lumbar Spine WO Con written report and disc dated 3/8/2019    CT Pelvis WO Con written report and disc dated 3/8/2019    MRI Spine Lumbar WO Con written report and disc dated 3/15/2019    MRI Spine Lumbar W/WO written report dated 5/29/2019 with Eleno Nayak MD    Patient was seen last in office on 5/8/24 with FEDERICO Russell. At last office visit, \"PLAN:    -Medications Prescribed: None.  Recommended Tylenol, NSAIDs, and other OTC pain medications  -Imaging Ordered: XR thoracic/lumbar spine AP/lateral  -Referrals Placed: Physical therapy  -Follow up: After imaging, therapy, and retrieval of outside medical records  -Continue follow-up with endocrine as recommended by their service  -Smoking cessation.\"     Patient has an upcoming appointment on 7/10/24 with FEDERICO Russell. All discs were downloaded via PAC's. A copy of medical reports were made and placed in providers folder for review. Original copy was placed in providers disc folder in triage room, will return to patient at upcoming visit.

## 2024-07-08 DIAGNOSIS — F41.9 ANXIETY: ICD-10-CM

## 2024-07-08 RX ORDER — FLECAINIDE ACETATE 100 MG/1
100 TABLET ORAL 2 TIMES DAILY
Qty: 180 TABLET | Refills: 1 | Status: SHIPPED | OUTPATIENT
Start: 2024-07-08

## 2024-07-09 ENCOUNTER — E-ADVICE (OUTPATIENT)
Dept: FAMILY MEDICINE | Age: 74
End: 2024-07-09

## 2024-07-09 RX ORDER — ESCITALOPRAM OXALATE 20 MG/1
20 TABLET ORAL DAILY
Qty: 30 TABLET | Refills: 0 | Status: SHIPPED | OUTPATIENT
Start: 2024-07-09

## 2024-07-10 ENCOUNTER — OFFICE VISIT (OUTPATIENT)
Dept: SURGERY | Facility: CLINIC | Age: 74
End: 2024-07-10
Payer: MEDICARE

## 2024-07-10 VITALS
DIASTOLIC BLOOD PRESSURE: 77 MMHG | WEIGHT: 153 LBS | SYSTOLIC BLOOD PRESSURE: 126 MMHG | HEART RATE: 77 BPM | BODY MASS INDEX: 25 KG/M2

## 2024-07-10 DIAGNOSIS — G89.29 CHRONIC BILATERAL THORACIC BACK PAIN: ICD-10-CM

## 2024-07-10 DIAGNOSIS — M54.50 CHRONIC BILATERAL LOW BACK PAIN WITHOUT SCIATICA: Primary | ICD-10-CM

## 2024-07-10 DIAGNOSIS — M54.6 CHRONIC BILATERAL THORACIC BACK PAIN: ICD-10-CM

## 2024-07-10 DIAGNOSIS — G89.29 CHRONIC BILATERAL LOW BACK PAIN WITHOUT SCIATICA: Primary | ICD-10-CM

## 2024-07-10 DIAGNOSIS — R20.0 BILATERAL HAND NUMBNESS: ICD-10-CM

## 2024-07-10 DIAGNOSIS — Z87.81 HISTORY OF COMPRESSION FRACTURE OF SPINE: ICD-10-CM

## 2024-07-10 DIAGNOSIS — G56.03 BILATERAL CARPAL TUNNEL SYNDROME: ICD-10-CM

## 2024-07-10 PROCEDURE — 99214 OFFICE O/P EST MOD 30 MIN: CPT | Performed by: STUDENT IN AN ORGANIZED HEALTH CARE EDUCATION/TRAINING PROGRAM

## 2024-07-10 NOTE — PATIENT INSTRUCTIONS
Refill policies:    Allow 2-3 business days for refills; controlled substances may take longer.  Contact your pharmacy at least 5 days prior to running out of medication and have them send an electronic request or submit request through the “request refill” option in your [employer] account.  Refills are not addressed on weekends; covering physicians do not authorize routine medications on weekends.  No narcotics or controlled substances are refilled after noon on Fridays or by on call physicians.  By law, narcotics must be electronically prescribed.  A 30 day supply with no refills is the maximum allowed.  If your prescription is due for a refill, you may be due for a follow up appointment.  To best provide you care, patients receiving routine medications need to be seen at least once a year.  Patients receiving narcotic/controlled substance medications need to be seen at least once every 3 months.  In the event that your preferred pharmacy does not have the requested medication in stock (e.g. Backordered), it is your responsibility to find another pharmacy that has the requested medication available.  We will gladly send a new prescription to that pharmacy at your request.    Scheduling Tests:    If your physician has ordered radiology tests such as MRI or CT scans, please contact Central Scheduling at [phone] right away to schedule the test.  Once scheduled, the [facility] Centralized Referral Team will work with your insurance carrier to obtain pre-certification or prior authorization.  Depending on your insurance carrier, approval may take 3-10 days.  It is highly recommended patients assure they have received an authorization before having a test performed.  If test is done without insurance authorization, patient may be responsible for the entire amount billed.      Precertification and Prior Authorizations:  If your physician has recommended that you have a procedure or additional testing performed the [facility]  Centralized Referral Team will contact your insurance carrier to obtain pre-certification or prior authorization.    You are strongly encouraged to contact your insurance carrier to verify that your procedure/test has been approved and is a COVERED benefit.  Although the [facility] Centralized Referral Team does its due diligence, the insurance carrier gives the disclaimer that \"Although the procedure is authorized, this does not guarantee payment.\"    Ultimately the patient is responsible for payment.   Thank you for your understanding in this matter.  Paperwork Completion:  If you require FMLA or disability paperwork for your recovery, please make sure to either drop it off or have it faxed to our office at [phone]. Be sure the form has your name and date of birth on it.  The form will be faxed to our Forms Department and they will complete it for you.  There is a 25$ fee for all forms that need to be filled out.  Please be aware there is a 10-14 day turnaround time.  You will need to sign a release of information (SAMMY) form if your paperwork does not come with one.  You may call the Forms Department with any questions at [phone].  Their fax number is [phone].

## 2024-07-10 NOTE — PROGRESS NOTES
Last office visit: 5/8/24  Most recent imaging dated on: 5/9/24 - XR Thoracic and Lumbar Spine  Pain Level: 0/10.   Numbness / Tingling: Patient reports numbness and tingling on both hands and bottom of right foot.  Physical Therapy / Injections: Patient denies completing any recent Physical Therapy / Injections

## 2024-07-11 NOTE — PROGRESS NOTES
Established Neurosurgery Patient    Patient: Antonia Gonzalez  Medical Record Number: [id number]  YOB: 1950  PCP: Fabi Blackwell MD    Reason for visit: Follow-up visit    HISTORY OF PRESENTING ILLNESS:  Antonia Gonzalez is a pleasant 73 year old female who returns to the neurosurgery clinic today for review of her x-rays and evaluation of her continued back pain.  The patient was last evaluated on 5/8/2024 in the neurosurgery office.  Recall that she has a history of L3, L4, and L5 compression fractures, acute on an MRI from 2019.  She continues to localize her pain to her thoracolumbar region.  She denies any radiation of the pain to her lower extremities bilaterally.  No lower extremity numbness, tingling, or weakness.  No bowel/bladder incontinence or saddle anesthesia.  I had recommended starting physical therapy at the last office visit, but she has not started this.  Her thoracic and lumbar x-rays were grossly unrevealing and similar in comparison to her scan from 2019.  Multilevel, multifactorial degenerative changes were noted.  No new fractures appreciated.  She continues to receive Prolia injections and follows with endocrinology.  She feels that the intermittent numbness in her hands is worse since last office visit.  She had been able to shake her hands out with good relief.  The numbness does not involve any 1 particular digit, but is diffuse throughout the whole hand.  She continues to deny any neck pain or radicular upper extremity pain, numbness, tingling, or weakness with the exception of the hands, although she denies any  strength weakness or difficulty with manual dexterity.  No imbalance or urinary urgency.  She had an EMG in 2018 which demonstrated bilateral carpal tunnel, right greater than left, and no cervical radiculopathy.  She has not had any management for carpal tunnel in the past.    Past Medical History:    Arrhythmia      No past surgical history on file.   No family  history on file.   Social History     Socioeconomic History    Marital status:    Tobacco Use    Smoking status: Every Day     Types: Cigarettes    Smokeless tobacco: Never      Allergies   Allergen Reactions    Augmentin [Amoxicillin-Pot Clavulanate] RASH    Ibuprofen SWELLING      Current Medications:  Current Outpatient Medications   Medication Sig Dispense Refill    Sulfamethoxazole-TMP DS (BACTRIM DS) 800-160 MG Oral Tab per tablet Take 1 tab PO BID x 2 weeks. Take with food. 28 tablet 0    mupirocin 2 % External Ointment Apply to both nostrils BID x 2 weeks 15 g 1    escitalopram 10 MG Oral Tab   4    Flecainide Acetate 50 MG Oral Tab   4    metoprolol Tartrate 25 MG Oral Tab   4        REVIEW OF SYSTEMS:  Comprehensive review of systems completed and negative with the exception of aforementioned information in the HPI.     PHYSICAL EXAM:  /77   Pulse 77   Wt 153 lb (69.4 kg)   BMI 24.69 kg/m²   Body mass index is 24.69 kg/m².  Wt Readings from Last 6 Encounters:   07/10/24 153 lb (69.4 kg)   05/08/24 151 lb (68.5 kg)        General: Well developed, well nourished, in no acute distress. Ambulates without assistance.    HEENT: Normocephalic, atraumatic.    Respirations: Non-labored     Neurologic / Musculoskeletal: Awake, alert, and interactive. Recent and remote memory appear intact. Attention span and concentration are appropriate. No dysarthria. Appropriately names objects. Coordination and motor control grossly intact. Patient follows commands briskly and appropriately.  Negative Phalen's bilaterally.  Positive Tinel's on the left, absent on the right.    Cervical Spine:    Motor/ Extremities   Deltoid Biceps Triceps  Hand intrinsics   Sensation   R 5/5 5/5 5/5 5/5 5/5 Hand diminished to light touch   L 5/5 5/5 5/5 5/5 5/5 Hand diminished to light touch       Lumbar Spine:    Motor / Extremities   Hip   flexion Knee   extension Dorsiflexion EHL Plantarflexion   Sensation   R 5/5 5/5 5/5  5/5 5/5 Intact to light touch   L 5/5 5/5 5/5 5/5 5/5 Intact to light touch     Reflexes:  -Simmons's Sign: Negative  -Clonus: Negative    IMAGING:  XR LUMBAR SPINE (MIN 2 VIEWS) (CPT=72100)    Result Date: 5/10/2024  CONCLUSION:  1. Multilevel degenerative changes of the lumbar spine, most pronounced at L2-L3.  2. Degenerative-pattern anterolisthesis L3 on L4 and L4 on L5, with minimal degenerative retrolisthesis of L2 on L3.  3. Chronic-appearing compression deformities of the lumbar spine are apparent.  4. Nonspecific calcification projecting over the left hemiabdomen. Consider correlation for potential urolithiasis as warranted on clinical grounds.    Dictated by (CST): Bryant Dinh MD on 5/10/2024 at 6:07 AM     Finalized by (CST): Bryant Dinh MD on 5/10/2024 at 6:11 AM          XR THORACIC SPINE (3 VIEWS) (CPT=72072)    Result Date: 5/10/2024  CONCLUSION:  1. Mild multilevel degenerative changes are seen throughout the thoracic spine.  2. Lesser incidental findings as above.    Dictated by (CST): Bryant Dinh MD on 5/10/2024 at 6:05 AM     Finalized by (CST): Bryant Dinh MD on 5/10/2024 at 6:07 AM            Imaging reviewed.  Images show the patient room today.    ASSESSMENT / PLAN:    ICD-10-CM   1. Chronic bilateral low back pain without sciatica  M54.50    G89.29      2. Chronic bilateral thoracic back pain  M54.6    G89.29      3. History of compression fracture of spine  Z87.81          4. Bilateral carpal tunnel syndrome  G56.03          5. Bilateral hand numbness  R20.0        Antonia Gonzalez returns to the clinic today for review of her thoracic lumbar x-rays, as well as continued assessment of her back pain and bilateral hand numbness.  Her x-rays demonstrate chronic degenerative changes without any new fractures.  She is on Prolia for optimization of her bone health and follows with endocrinology.  There is no role for neurosurgical intervention.  She continues to endorse back pain.  I  again encouraged her to start physical therapy for stretching and strengthening exercises.  Her bilateral hand numbness seems less likely related to her cervical spine.  EMG in the past has demonstrated bilateral carpal tunnel syndrome without cervical radiculopathy.  I encouraged the patient to trial cock up wrist splints.  I encouraged her to discuss these symptoms with her therapist as well, and will also put a physiatry referral in for her aforementioned concerns.    -Medications Prescribed: None  -Imaging Ordered: None  -Referrals Placed: Physical therapy, physiatry  -Follow up: as needed      Plan was reviewed and discussed in detail with the patient. Patient encouraged to call the office with any questions or concerns of new/worsening neurologic symptoms. Patient demonstrated good understanding and was agreeable with the plan.     Visit time: 30 minutes   Over 50% of that time was spent providing patient education and discussing care plan.    Drew Crain PA-C  Physician Assistant- Neurosurgery   [facility]  7/11/2024, 9:10 AM

## 2024-08-06 DIAGNOSIS — F41.9 ANXIETY: ICD-10-CM

## 2024-08-06 RX ORDER — ESCITALOPRAM OXALATE 20 MG/1
20 TABLET ORAL DAILY
Qty: 30 TABLET | Refills: 0 | Status: SHIPPED | OUTPATIENT
Start: 2024-08-06 | End: 2024-09-04

## 2024-08-07 RX ORDER — APIXABAN 5 MG/1
5 TABLET, FILM COATED ORAL EVERY 12 HOURS
Qty: 180 TABLET | Refills: 0 | Status: SHIPPED | OUTPATIENT
Start: 2024-08-07

## 2024-08-13 ENCOUNTER — CLINICAL ABSTRACT (OUTPATIENT)
Dept: INFUSION THERAPY | Age: 74
End: 2024-08-13

## 2024-08-13 DIAGNOSIS — M81.0 SENILE OSTEOPOROSIS: Primary | ICD-10-CM

## 2024-08-14 ENCOUNTER — TELEPHONE (OUTPATIENT)
Dept: PHYSICAL THERAPY | Age: 74
End: 2024-08-14

## 2024-08-23 ENCOUNTER — HOSPITAL ENCOUNTER (OUTPATIENT)
Dept: INFUSION THERAPY | Age: 74
Discharge: HOME | End: 2024-08-23
Attending: INTERNAL MEDICINE

## 2024-08-23 ENCOUNTER — LAB SERVICES (OUTPATIENT)
Dept: LAB | Age: 74
End: 2024-08-23
Attending: INTERNAL MEDICINE

## 2024-08-23 VITALS
RESPIRATION RATE: 16 BRPM | OXYGEN SATURATION: 98 % | SYSTOLIC BLOOD PRESSURE: 110 MMHG | HEART RATE: 51 BPM | TEMPERATURE: 98.1 F | DIASTOLIC BLOOD PRESSURE: 67 MMHG

## 2024-08-23 DIAGNOSIS — M81.0 SENILE OSTEOPOROSIS: ICD-10-CM

## 2024-08-23 DIAGNOSIS — M81.0 SENILE OSTEOPOROSIS: Primary | ICD-10-CM

## 2024-08-23 LAB
25(OH)D3+25(OH)D2 SERPL-MCNC: 53.7 NG/ML (ref 30–100)
ALBUMIN SERPL-MCNC: 3.4 G/DL (ref 3.6–5.1)
ALBUMIN/GLOB SERPL: 1 {RATIO} (ref 1–2.4)
ALP SERPL-CCNC: 57 UNITS/L (ref 45–117)
ALT SERPL-CCNC: 20 UNITS/L
ANION GAP SERPL CALC-SCNC: 7 MMOL/L (ref 7–19)
AST SERPL-CCNC: 17 UNITS/L
BILIRUB SERPL-MCNC: 0.5 MG/DL (ref 0.2–1)
BUN SERPL-MCNC: 8 MG/DL (ref 6–20)
BUN/CREAT SERPL: 12 (ref 7–25)
CALCIUM SERPL-MCNC: 8.6 MG/DL (ref 8.4–10.2)
CHLORIDE SERPL-SCNC: 104 MMOL/L (ref 97–110)
CO2 SERPL-SCNC: 27 MMOL/L (ref 21–32)
CREAT SERPL-MCNC: 0.68 MG/DL (ref 0.51–0.95)
EGFRCR SERPLBLD CKD-EPI 2021: >90 ML/MIN/{1.73_M2}
FASTING DURATION TIME PATIENT: ABNORMAL H
GLOBULIN SER-MCNC: 3.5 G/DL (ref 2–4)
GLUCOSE SERPL-MCNC: 95 MG/DL (ref 70–99)
POTASSIUM SERPL-SCNC: 4 MMOL/L (ref 3.4–5.1)
PROT SERPL-MCNC: 6.9 G/DL (ref 6.4–8.2)
SODIUM SERPL-SCNC: 134 MMOL/L (ref 135–145)

## 2024-08-23 PROCEDURE — 80053 COMPREHEN METABOLIC PANEL: CPT

## 2024-08-23 PROCEDURE — 10002800 HB RX 250 W HCPCS: Performed by: INTERNAL MEDICINE

## 2024-08-23 PROCEDURE — 82306 VITAMIN D 25 HYDROXY: CPT

## 2024-08-23 PROCEDURE — 96372 THER/PROPH/DIAG INJ SC/IM: CPT | Performed by: INTERNAL MEDICINE

## 2024-08-23 PROCEDURE — 36415 COLL VENOUS BLD VENIPUNCTURE: CPT

## 2024-08-23 RX ADMIN — DENOSUMAB 60 MG: 60 INJECTION SUBCUTANEOUS at 15:25

## 2024-09-04 DIAGNOSIS — F41.9 ANXIETY: ICD-10-CM

## 2024-09-04 RX ORDER — ESCITALOPRAM OXALATE 20 MG/1
TABLET ORAL
Qty: 30 TABLET | Refills: 0 | Status: SHIPPED | OUTPATIENT
Start: 2024-09-04

## 2024-09-05 ENCOUNTER — OFFICE VISIT (OUTPATIENT)
Dept: PHYSICAL THERAPY | Age: 74
End: 2024-09-05
Attending: STUDENT IN AN ORGANIZED HEALTH CARE EDUCATION/TRAINING PROGRAM
Payer: MEDICARE

## 2024-09-05 DIAGNOSIS — Z87.81 HISTORY OF COMPRESSION FRACTURE OF SPINE: ICD-10-CM

## 2024-09-05 DIAGNOSIS — M54.50 CHRONIC BILATERAL LOW BACK PAIN WITHOUT SCIATICA: Primary | ICD-10-CM

## 2024-09-05 DIAGNOSIS — M54.6 CHRONIC BILATERAL THORACIC BACK PAIN: ICD-10-CM

## 2024-09-05 DIAGNOSIS — G89.29 CHRONIC BILATERAL THORACIC BACK PAIN: ICD-10-CM

## 2024-09-05 DIAGNOSIS — G56.03 BILATERAL CARPAL TUNNEL SYNDROME: ICD-10-CM

## 2024-09-05 DIAGNOSIS — G89.29 CHRONIC BILATERAL LOW BACK PAIN WITHOUT SCIATICA: Primary | ICD-10-CM

## 2024-09-05 PROCEDURE — 97162 PT EVAL MOD COMPLEX 30 MIN: CPT

## 2024-09-05 PROCEDURE — 97110 THERAPEUTIC EXERCISES: CPT

## 2024-09-05 NOTE — PROGRESS NOTES
SPINE EVALUATION:     Diagnosis:   Chronic bilateral low back pain without sciatica (M54.50,G89.29)  Chronic bilateral thoracic back pain (M54.6,G89.29)  History of compression fracture of spine (Z87.81)  Bilateral carpal tunnel syndrome (G56.03)         Referring Provider: Drew Crain  Date of Evaluation:    9/5/2024    Precautions:  None Next MD visit:   none scheduled  Date of Surgery: n/a     PATIENT SUMMARY   Antonia Gonzalez is a 73 year old female who presents to therapy today with complaints of lower back pain; she has a several year history of low back pain.  Her pain is located in the central low back and this is an \"intense pain\".  She uses rest or activity modification to manage her pain complaints.  She does describes some intermittent numbness in the bottom of the right foot with prolonged standing.  She also reports intermittent numbness in the hands related to carpal tunnel; she notices more numbness in the hands with cooking and driving bilaterally. She has not have any other numbness or tingling in the legs and denies any use of an AD. She denies any falls.  Patient would like physical therapy at this time to improve their functional independence.     Pt describes pain level current 0/10, at best 0/10, at worst 8-10/10.   Current functional limitations include bending forward, gardening, prolonged walking, prolonged standing, uses a shopping cart for walking in the store, not able to lift and carry things. She does have some numbness in the right and left hands with cooking and driving     Antonia describes prior level of function jonatan to do most ADLS with some back pain . Pt goals include decrease her back pain and be able to walk more.    Past medical history was reviewed with Antonia. Significant findings include hx of carpal tunnel, low back pain, L3-5 compression fracture, DDD lumbar spine and thoracic spines and history of breast cancer.  Pt denies diplopia, dysarthria, dysphasia, dizziness,  drop attacks, bowel/bladder changes, saddle anesthesia, and JOEL LE N/T.    ASSESSMENT  Antonia presents to physical therapy evaluation with primary c/o lower back pain and intermittent hand numbness. The results of the objective tests and measures show decreased lumbar range of motion and decrease strenght of the core stabilizers and proximal hips which is causing her compensations and overuse at the lower lumbar spine.  Functional deficits include but are not limited to pain with prolonged standing and walking, lifting, and bending forward.  Signs and symptoms are consistent with diagnosis of chronic low back pain and carpal tunnel symptoms. Pt and PT discussed evaluation findings, pathology, POC and HEP.  Pt voiced understanding and performs HEP correctly without reported pain. Skilled Physical Therapy is medically necessary to address the above impairments and reach functional goals.     OBJECTIVE:   Observation/Posture: Patient stands with minimal anterior pelvic tilt, tall pelvis, min lumbar extension, bilateral femoral MR, and bilateral pronation (R>L).   Neuro Screen: No deficits to light touch in the bilateral LE. Not assessed in the UE this visit will be assessed next visit    Cervical AROM WFL and no change in distal symptoms    Lumbar  AROM: (* denotes performed with pain)  Flexion: 25% decrease in range with central low back pain at end and with return*  Extension: WFL with central low back pain*   Sidebending: R WFL with pivot point at L4 and pain free ; L WFL with pivot point at L4 and central low back pain*  Rotation: R WFL and pain free ; L WFL with pain in the central low back *    Accessory motion: hip ER and IR WFL bilaterally assessed in sitting  Palpation: Minimal tender at the bilateral lumbar paraspinals    Strength: (* denotes performed with pain)  LE   Hip flexion (L2): R 4/5; L 4/5  Hip abduction: R 4/5; L 4/5  Hip Extension: R 3+/5; L 3+/5   Hip ER: R 4/5; L 4/5  Hip IR: R 4+/5; L 4+/5  Knee  Flexion: R 4/5; L 4/5   Knee extension (L3): R 4+/5; L 4+/5   DF (L4): R 5/5; L 5/5  Great Toe Ext (L5): R 5/5, L 5/5  PF (S1): R 5/5; L 5/5  Lower abdominals: 1-/5  Glut med: R 3+/5, L 3+/5     Flexibility:   LE   Hamstrings: R -30; L -35  Piriformis: R stiff; L stiff     Special tests:   ARLENE: R neg L post  Sit to stand: use of hands, increased knee valgus and decreased speed  Sit to stand x 10: 40 sec  and use of hands    Gait: pt ambulates on level ground with increased PADMINI, decreased bilateral push off and increased knee valgus.   Balance: SLS R 2 sec, L 7 sec    Today’s Treatment and Response:   Pt education was provided on exam findings, treatment diagnosis, treatment plan, expectations, and prognosis. Pt was also provided recommendations for activity modifications, possible soreness after evaluation, postural corrections, and importance of remaining active  Patient was instructed in and issued a HEP for: Access Code: [email or web address]  URL: [email or web address]  Date: 09/05/2024  Prepared by: Licha Mcgowan-Brittichong    Exercises  - Supine Transversus Abdominis Bracing - Hands on Stomach  - 1 x daily - 7 x weekly - 2 sets - 10 reps - 3 hold  - Supine Figure 4 Piriformis Stretch  - 1 x daily - 7 x weekly - 1 sets - 3 reps - 20 sec hold  - Seated Hip Abduction  - 1 x daily - 7 x weekly - 3 sets - 10 reps  - Seated March with Resistance  - 1 x daily - 7 x weekly - 3 sets - 10 reps    Charges: PT Eval Moderate Complexity, 1 ther ex      Total Timed Treatment: 12 min     Total Treatment Time: 40 min.  Interventions performed at the initial evaluation:   Supine TA isometric x 10, 3\", supine figure 4 stretch with push 1 x 20 \" R/L, seated hip clams RTB x 10, Seated marches x 10 alternating RTB     Based on clinical rationale and outcome measures, this evaluation involved Moderate Complexity decision making due to 1-2 personal factors/comorbidities, 3 body structures involved/activity limitations, and  evolving symptoms including changing pain levels.  PLAN OF CARE:    Goals: (to be met in 10 visits)   1. Patient will be independent in a progressive HEP to help manage symptoms and achieve functional independence in 3 sessions.  2. Patient will decrease his max pain to 4/10 as needed to improve his functional independence in 3 weeks.  3. Patient will increase her lower abdominal strength to 1+/5 as needed to improve her lumbar stability with prolonged standing  4. Patient will increase her glut med strength to 4-/5 or b=greater as needed to improve her lumbopelvic stability for decreased pain with walking.  5. Patient will increase her low ab strenght to 2-/5 to help improve her lumbar endurance with walking in the community without use of a cart.  6. Patient will increase her lumbar flexion range to WFL and pain free to allow her to bend to reach for items during daily chores or in the garden  7. Patient will increase her SLB to greater than 15 sec bilaterally to improve her safety and stability with walking community distances.     Frequency / Duration: Patient will be seen for 1-2 x/week or a total of 10 visits over a 90 day period. Treatment will include: Gait training, Manual Therapy, Neuromuscular Re-education, Self-Care Home Management, Therapeutic Activities, Therapeutic Exercise, and Home Exercise Program instruction    Education or treatment limitation: None  Rehab Potential:good    Oswestry Disability Index Score  No data recorded    Patient/Family/Caregiver was advised of these findings, precautions, and treatment options and has agreed to actively participate in planning and for this course of care.    Thank you for your referral. Please co-sign or sign and return this letter via fax as soon as possible to [phone]. If you have any questions, please contact me at Dept: [phone]    Sincerely,  Electronically signed by therapist: Licha Henao PT    Physician's certification  required: Yes  I certify the need for these services furnished under this plan of treatment and while under my care.    X___________________________________________________ Date____________________    Certification From: 9/5/2024  To:12/4/2024

## 2024-09-10 ENCOUNTER — APPOINTMENT (OUTPATIENT)
Dept: PHYSICAL THERAPY | Age: 74
End: 2024-09-10
Attending: STUDENT IN AN ORGANIZED HEALTH CARE EDUCATION/TRAINING PROGRAM
Payer: MEDICARE

## 2024-09-12 ENCOUNTER — TELEPHONE (OUTPATIENT)
Dept: PHYSICAL THERAPY | Facility: HOSPITAL | Age: 74
End: 2024-09-12

## 2024-09-13 ENCOUNTER — APPOINTMENT (OUTPATIENT)
Dept: PHYSICAL THERAPY | Age: 74
End: 2024-09-13
Attending: STUDENT IN AN ORGANIZED HEALTH CARE EDUCATION/TRAINING PROGRAM
Payer: MEDICARE

## 2024-09-18 ENCOUNTER — OFFICE VISIT (OUTPATIENT)
Dept: PHYSICAL THERAPY | Age: 74
End: 2024-09-18
Attending: STUDENT IN AN ORGANIZED HEALTH CARE EDUCATION/TRAINING PROGRAM
Payer: MEDICARE

## 2024-09-18 PROCEDURE — 97110 THERAPEUTIC EXERCISES: CPT

## 2024-09-18 PROCEDURE — 97112 NEUROMUSCULAR REEDUCATION: CPT

## 2024-09-18 NOTE — PROGRESS NOTES
Diagnosis:   Chronic bilateral low back pain without sciatica (M54.50,G89.29)  Chronic bilateral thoracic back pain (M54.6,G89.29)  History of compression fracture of spine (Z87.81)  Bilateral carpal tunnel syndrome (G56.03)      Referring Provider: Drew Crain  Date of Evaluation:    9/5/2024    Precautions:  None Next MD visit:   none scheduled  Date of Surgery: n/a   Insurance Primary/Secondary: MEDICARE / [employer] [place] INDEMNITY     # Auth Visits: 10 visits auth until 12/4/2024            Subjective: Patient reports that she is not having any current back pain. She hasn't tried the exercises at this point. She is noticing that with some direct pressure on the right forearm there is more pain on the right side.     Pain: 0/10      Objective: See treatment log    AROM:   Wrist flex: R 62, L 77  Wrist ext R 71, L 72    Sensation UE: no deficits to light touch and symmetrical    Assessment: Continued with progressive strengthening this session to improve her lumbopelvic stability for better control of her low back. Added some gentle wrist stretches to promote improved mobility to decrease pain with prolonged UE use during ADLS. Need to progress balance as that will help to prevent stress to the lumbar spine during any balance challenges and with walking on all terrains.       Goals:   Goals: (to be met in 10 visits)   1. Patient will be independent in a progressive HEP to help manage symptoms and achieve functional independence in 3 sessions.  2. Patient will decrease his max pain to 4/10 as needed to improve his functional independence in 3 weeks.  3. Patient will increase her lower abdominal strength to 1+/5 as needed to improve her lumbar stability with prolonged standing  4. Patient will increase her glut med strength to 4-/5 or b=greater as needed to improve her lumbopelvic stability for decreased pain with walking.  5. Patient will increase her low ab strenght to 2-/5 to help improve her lumbar endurance with  walking in the community without use of a cart.  6. Patient will increase her lumbar flexion range to WFL and pain free to allow her to bend to reach for items during daily chores or in the garden  7. Patient will increase her SLB to greater than 15 sec bilaterally to improve her safety and stability with walking community distances.     Plan: Continue with progressive strengthening and flexibility; progress with SLB, LAQ and standing extension as tolerated. Progress HEP.     Date: 9/18/2024  TX#: 2/10 Date:                 TX#: 3/   Man     Ther ex   RSB DKTC x 15  RSB LTR x 10  Figure 4 stretch with push 3 x 20\" R/L      Seated clams 2 x 10 RTB  Seated marches 2 x 10 RTB    Wrist extension stretch 3 x 10\" R/L  Wrist flexion stretch 3 x 10\" R  Rows2x 10 GTB    Standing hip abduction 2 x 10 R/L  Marches on Airex x 20      NMR TA isometric 2 x 10, 3\"  Supine marches 2 x 10  Seated isometric adduction 2 x 10, 3\"    SLB NV    Ther act     HEP: IE:  Supine Transversus Abdominis Bracing - Hands on Stomach  - 1 x daily - 7 x weekly - 2 sets - 10 reps - 3 hold  - Supine Figure 4 Piriformis Stretch  - 1 x daily - 7 x weekly - 1 sets - 3 reps - 20 sec hold  - Seated Hip Abduction  - 1 x daily - 7 x weekly - 3 sets - 10 reps  - Seated March with Resistance  - 1 x daily - 7 x weekly - 3 sets - 10 reps    Charges: 1 NMR (8 min) 2 ther ex (30 min)        Total Timed Treatment: 38 min  Total Treatment Time: 39 min    Certification From: 9/5/2024  To:12/4/2024

## 2024-09-20 ENCOUNTER — OFFICE VISIT (OUTPATIENT)
Dept: PHYSICAL THERAPY | Age: 74
End: 2024-09-20
Attending: STUDENT IN AN ORGANIZED HEALTH CARE EDUCATION/TRAINING PROGRAM
Payer: MEDICARE

## 2024-09-20 PROCEDURE — 97110 THERAPEUTIC EXERCISES: CPT

## 2024-09-20 PROCEDURE — 97112 NEUROMUSCULAR REEDUCATION: CPT

## 2024-09-20 NOTE — PROGRESS NOTES
Diagnosis:   Chronic bilateral low back pain without sciatica (M54.50,G89.29)  Chronic bilateral thoracic back pain (M54.6,G89.29)  History of compression fracture of spine (Z87.81)  Bilateral carpal tunnel syndrome (G56.03)      Referring Provider: Drew Crain  Date of Evaluation:    9/5/2024    Precautions:  None Next MD visit:   none scheduled  Date of Surgery: n/a   Insurance Primary/Secondary: MEDICARE / [employer] INDEMNITY     # Auth Visits: 10 visits auth until 12/4/2024            Subjective: Patient reports that she was not more sore after the last session and she is not too painful today.  She has not stresses the hands today so she is not having any increased pain complaints.    Pain: 0/10      Objective: See treatment log    AROM:   Wrist flex: R 62, L 77  Wrist ext R 71, L 72    Sensation UE: no deficits to light touch and symmetrical    Assessment:  Patient had some minimal low back pain with piriformis stretching this session. Continued with strengthening exercises to help improve her lumbopelvic stability to manage pain and symptoms with bending and other dynamic ADLS.        Goals:   Goals: (to be met in 10 visits)   1. Patient will be independent in a progressive HEP to help manage symptoms and achieve functional independence in 3 sessions.  2. Patient will decrease his max pain to 4/10 as needed to improve his functional independence in 3 weeks.  3. Patient will increase her lower abdominal strength to 1+/5 as needed to improve her lumbar stability with prolonged standing  4. Patient will increase her glut med strength to 4-/5 or b=greater as needed to improve her lumbopelvic stability for decreased pain with walking.  5. Patient will increase her low ab strenght to 2-/5 to help improve her lumbar endurance with walking in the community without use of a cart.  6. Patient will increase her lumbar flexion range to WFL and pain free to allow her to bend to reach for items during daily chores or in the  garden  7. Patient will increase her SLB to greater than 15 sec bilaterally to improve her safety and stability with walking community distances.     Plan: Continue with progressive strengthening and flexibility; progress with resisted extension, LAQ and multifidus exercises as tolerated. .     Date: 9/18/2024  TX#: 2/10 Date: 9/20/24                TX#: 3/10   Man     Ther ex   RSB DKTC x 15  RSB LTR x 10  Figure 4 stretch with push 3 x 20\" R/L      Seated clams 2 x 10 RTB  Seated marches 2 x 10 RTB    Wrist extension stretch 3 x 10\" R/L  Wrist flexion stretch 3 x 10\" R  Rows 2x 10 GTB    Standing hip abduction 2 x 10 R/L  Marches on Airex x 20     RSB DKTC x 15  RSB LTR x 10  Figure 4 stretch with push 3 x 20\" R/L      Seated clams 2 x 10 RTB  Seated marches 2 x 10 RTB    Wrist extension stretch 3 x 10\" R/L  Wrist flexion stretch 3 x 10\" R  Rows 2x 10 GTB    Standing hip abduction 2 x 10 R/L  Standing hip extension 1 x 10 R/L  Marches on Airex x 20   NMR TA isometric 2 x 10, 3\"  Supine marches 2 x 10  Seated isometric adduction 2 x 10, 3\"    SLB NV TA isometric 2 x 10, 3\"  Supine marches 2 x 10  Supine isometric adduction 2 x 10, 3\"    SLB 2 x 20\" R/L    Ther act     HEP: IE:  Supine Transversus Abdominis Bracing - Hands on Stomach  - 1 x daily - 7 x weekly - 2 sets - 10 reps - 3 hold  - Supine Figure 4 Piriformis Stretch  - 1 x daily - 7 x weekly - 1 sets - 3 reps - 20 sec hold  - Seated Hip Abduction  - 1 x daily - 7 x weekly - 3 sets - 10 reps  - Seated March with Resistance  - 1 x daily - 7 x weekly - 3 sets - 10 reps  9/20 supine marches, standing hip abd, wrist ext  Charges: 1 NMR (9 min) 2 ther ex (30 min)        Total Timed Treatment: 39 min  Total Treatment Time: 39 min    Certification From: 9/5/2024  To:12/4/2024

## 2024-09-24 PROBLEM — G89.29 CHRONIC RIGHT-SIDED BACK PAIN: Status: RESOLVED | Noted: 2019-03-15 | Resolved: 2024-09-24

## 2024-09-24 PROBLEM — S32.050A COMPRESSION FRACTURE OF FIFTH LUMBAR VERTEBRA  (CMD): Status: RESOLVED | Noted: 2019-03-15 | Resolved: 2024-09-24

## 2024-09-24 PROBLEM — M54.9 BACK PAIN: Status: RESOLVED | Noted: 2019-03-15 | Resolved: 2024-09-24

## 2024-09-24 PROBLEM — S32.040A CLOSED COMPRESSION FRACTURE OF FOURTH LUMBAR VERTEBRA  (CMD): Status: RESOLVED | Noted: 2019-03-15 | Resolved: 2024-09-24

## 2024-09-24 PROBLEM — M54.9 CHRONIC RIGHT-SIDED BACK PAIN: Status: RESOLVED | Noted: 2019-03-15 | Resolved: 2024-09-24

## 2024-09-25 ENCOUNTER — OFFICE VISIT (OUTPATIENT)
Dept: PHYSICAL THERAPY | Age: 74
End: 2024-09-25
Attending: STUDENT IN AN ORGANIZED HEALTH CARE EDUCATION/TRAINING PROGRAM
Payer: MEDICARE

## 2024-09-25 PROCEDURE — 97112 NEUROMUSCULAR REEDUCATION: CPT

## 2024-09-25 PROCEDURE — 97110 THERAPEUTIC EXERCISES: CPT

## 2024-09-25 NOTE — PROGRESS NOTES
Diagnosis:   Chronic bilateral low back pain without sciatica (M54.50,G89.29)  Chronic bilateral thoracic back pain (M54.6,G89.29)  History of compression fracture of spine (Z87.81)  Bilateral carpal tunnel syndrome (G56.03)      Referring Provider: Drew Crain  Date of Evaluation:    9/5/2024    Precautions:  None Next MD visit:   none scheduled  Date of Surgery: n/a   Insurance Primary/Secondary: MEDICARE / [employer] [place] INDEMNITY     # Auth Visits: 10 visits auth until 12/4/2024            Subjective: Patient reports that she  did lift something a little bit heavy on Saturday so that caused some increased low back pain; that has slowly improved over the last few days and today is only minimally sore.  She knew she lifted something too heavy when she did it. She was able to resume her exercises yesterday.    Pain: 0/10      Objective: See treatment log    AROM:   Wrist flex: R 62, L 77  Wrist ext R 71, L 72    Sensation UE: no deficits to light touch and symmetrical    Assessment:   Progressed this session with low ab progression and pallof punches to improve her lumbopelvic stability as needed to decrease pain and symptoms with ADLS.  She continues to need increased strength of the spinal stabilizers to improve her posture to decrease back and UE symptoms; needs reinforcement.       Goals:   Goals: (to be met in 10 visits)   1. Patient will be independent in a progressive HEP to help manage symptoms and achieve functional independence in 3 sessions.  2. Patient will decrease his max pain to 4/10 as needed to improve his functional independence in 3 weeks.  3. Patient will increase her lower abdominal strength to 1+/5 as needed to improve her lumbar stability with prolonged standing  4. Patient will increase her glut med strength to 4-/5 or b=greater as needed to improve her lumbopelvic stability for decreased pain with walking.  5. Patient will increase her low ab strenght to 2-/5 to help improve her lumbar endurance  with walking in the community without use of a cart.  6. Patient will increase her lumbar flexion range to WFL and pain free to allow her to bend to reach for items during daily chores or in the garden  7. Patient will increase her SLB to greater than 15 sec bilaterally to improve her safety and stability with walking community distances.     Plan: Continue with progressive strengthening and flexibility; progress with resisted ER with retraction in standing to improve stability.     Date: 9/20/24                TX#: 3/10 Date: 9/25/24                TX#: 4/10   Man     Ther ex   RSB DKTC x 15  RSB LTR x 10  Figure 4 stretch with push 3 x 20\" R/L      Seated clams 2 x 10 RTB  Seated marches 2 x 10 RTB    Wrist extension stretch 3 x 10\" R/L  Wrist flexion stretch 3 x 10\" R  Rows 2x 10 GTB    Standing hip abduction 2 x 10 R/L  Standing hip extension 1 x 10 R/L  Marches on Airex x 20   RSB DKTC x 15  RSB LTR x 10  Figure 4 stretch with push 2 x 20\" R/L      Seated clams 2 x 10 RTB  Seated marches 2 x 10 RTB    Wrist extension stretch 3 x 10\" R/L  Wrist flexion stretch 3 x 10\" R  Rows 2x 10 GTB    Standing hip abduction 2 x 10 R/L  Standing hip extension 1 x 10 R/L  Marches on Airex x 20  Pallof punches x 10 R/L GTT   NMR TA isometric 2 x 10, 3\"  Supine marches 2 x 10  Supine isometric adduction 2 x 10, 3\"    SLB 2 x 20\" R/L  TA isometric 2 x 10, 3\"  Supine marches 2 x 10  Low ab level 1: x 10  Supine isometric adduction 2 x 10, 3\"    SLB 2 x 20\" R/L   Ther act     HEP: IE:  Supine Transversus Abdominis Bracing - Hands on Stomach  - 1 x daily - 7 x weekly - 2 sets - 10 reps - 3 hold  - Supine Figure 4 Piriformis Stretch  - 1 x daily - 7 x weekly - 1 sets - 3 reps - 20 sec hold  - Seated Hip Abduction  - 1 x daily - 7 x weekly - 3 sets - 10 reps  - Seated March with Resistance  - 1 x daily - 7 x weekly - 3 sets - 10 reps  9/20 supine marches, standing hip abd, wrist ext  Charges: 1 NMR (10 min) 2 ther ex (30 min)         Total Timed Treatment: 40 min  Total Treatment Time: 41 min    Certification From: 9/5/2024  To:12/4/2024

## 2024-09-26 ENCOUNTER — APPOINTMENT (OUTPATIENT)
Dept: FAMILY MEDICINE | Age: 74
End: 2024-09-26

## 2024-09-27 ENCOUNTER — APPOINTMENT (OUTPATIENT)
Dept: PHYSICAL THERAPY | Age: 74
End: 2024-09-27
Attending: STUDENT IN AN ORGANIZED HEALTH CARE EDUCATION/TRAINING PROGRAM
Payer: MEDICARE

## 2024-09-30 ENCOUNTER — TELEPHONE (OUTPATIENT)
Dept: FAMILY MEDICINE | Age: 74
End: 2024-09-30

## 2024-10-01 ENCOUNTER — APPOINTMENT (OUTPATIENT)
Dept: FAMILY MEDICINE | Age: 74
End: 2024-10-01

## 2024-10-01 VITALS
RESPIRATION RATE: 16 BRPM | HEIGHT: 65 IN | SYSTOLIC BLOOD PRESSURE: 98 MMHG | BODY MASS INDEX: 26.26 KG/M2 | TEMPERATURE: 97.8 F | HEART RATE: 60 BPM | OXYGEN SATURATION: 97 % | DIASTOLIC BLOOD PRESSURE: 50 MMHG | WEIGHT: 157.6 LBS

## 2024-10-01 DIAGNOSIS — Z00.00 MEDICARE ANNUAL WELLNESS VISIT, SUBSEQUENT: ICD-10-CM

## 2024-10-01 DIAGNOSIS — F17.210 SMOKES WITH GREATER THAN 30 PACK YEAR HISTORY: ICD-10-CM

## 2024-10-01 DIAGNOSIS — F41.9 ANXIETY: Primary | ICD-10-CM

## 2024-10-01 DIAGNOSIS — Z23 INFLUENZA VACCINE NEEDED: ICD-10-CM

## 2024-10-01 PROBLEM — Z85.3 HISTORY OF BREAST CANCER: Status: ACTIVE | Noted: 2024-10-01

## 2024-10-01 PROBLEM — D05.90 CARCINOMA IN SITU OF BREAST: Status: RESOLVED | Noted: 2022-09-20 | Resolved: 2024-10-01

## 2024-10-01 PROBLEM — D05.12 INTRADUCTAL CARCINOMA IN SITU OF LEFT BREAST: Status: RESOLVED | Noted: 2020-06-04 | Resolved: 2024-10-01

## 2024-10-01 RX ORDER — ESCITALOPRAM OXALATE 20 MG/1
20 TABLET ORAL DAILY
Qty: 90 TABLET | Refills: 3 | Status: SHIPPED | OUTPATIENT
Start: 2024-10-01

## 2024-10-01 ASSESSMENT — PATIENT HEALTH QUESTIONNAIRE - PHQ9
1. LITTLE INTEREST OR PLEASURE IN DOING THINGS: NOT AT ALL
SUM OF ALL RESPONSES TO PHQ9 QUESTIONS 1 AND 2: 0
SUM OF ALL RESPONSES TO PHQ9 QUESTIONS 1 AND 2: 1
1. LITTLE INTEREST OR PLEASURE IN DOING THINGS: NOT AT ALL
SUM OF ALL RESPONSES TO PHQ9 QUESTIONS 1 AND 2: 1
SUM OF ALL RESPONSES TO PHQ9 QUESTIONS 1 AND 2: 0
CLINICAL INTERPRETATION OF PHQ2 SCORE: NO FURTHER SCREENING NEEDED
CLINICAL INTERPRETATION OF PHQ2 SCORE: NO FURTHER SCREENING NEEDED
2. FEELING DOWN, DEPRESSED OR HOPELESS: NOT AT ALL
2. FEELING DOWN, DEPRESSED OR HOPELESS: SEVERAL DAYS

## 2024-10-01 ASSESSMENT — ACTIVITIES OF DAILY LIVING (ADL)
NEEDS ASSISTANCE WITH FOOD: INDEPENDENT
USING TELEPHONE: INDEPENDENT
DOING HOUSEWORK: INDEPENDENT
ADL_BEFORE_ADMISSION: INDEPENDENT
EATING: INDEPENDENT
ADL_SCORE: 12
RECENT_DECLINE_ADL: NO
USING TRANSPORTATION: INDEPENDENT
NEEDS_ASSIST: NO
ADL_SCORE: 12
TAKING MEDICATION: INDEPENDENT
PREPARING MEALS: INDEPENDENT
STIL DRIVING: YES
GROCERY SHOPPING: INDEPENDENT
ADL_SHORT_OF_BREATH: NO
ADL_BEFORE_ADMISSION: INDEPENDENT
MANAGING FINANCES: INDEPENDENT
PILL BOX USED: YES
SENSORY_SUPPORT_DEVICES: HEARING AIDS;EYEGLASSES
RECENT_DECLINE_ADL: NO
ADL_SHORT_OF_BREATH: NO

## 2024-10-01 ASSESSMENT — MINI COG
PATIENT ABLE TO REPEAT THE 3 WORDS GIVEN PREVIOUSLY?: WAS ABLE TO REPEAT BACK 2 WORDS CORRECTLY
TOTAL SCORE: 2
PATIENT ABLE TO FILL IN THE CLOCK FACE WITH 10 MINUTES PAST 11 O'CLOCK?: NO, CLOCK IS NOT CORRECT
PATIENT WAS GIVEN REPEAT BACK WORDS FROM VERSION: 1 - BANANA SUNRISE CHAIR

## 2024-10-01 ASSESSMENT — ENCOUNTER SYMPTOMS
SHORTNESS OF BREATH: 0
ABDOMINAL PAIN: 0
DIZZINESS: 0
COUGH: 0

## 2024-10-01 ASSESSMENT — GERIATRIC MINI NUTRITIONAL ASSESSMENT (MNA)
E NEUROPSYCHOLOGICAL PROBLEMS: NO PSYCHOLOGICAL PROBLEMS
B WEIGHT LOSS DURING THE LAST 3 MONTHS: NO WEIGHT LOSS
D HAS SUFFERED PSYCHOLOGICAL STRESS OR ACUTE DISEASE IN THE PAST 3 MONTHS?: NO
C GENERAL MOBILITY: GOES OUT
A HAS FOOD INTAKE DECLINED OVER THE PAST 3 MONTHS DUE TO LOSS OF APPETITE, DIGESTIVE PROBLEMS, CHEWING OR SWALLOWING DIFFICULTIES?: NO DECREASE IN FOOD INTAKE

## 2024-10-01 ASSESSMENT — PAIN SCALES - GENERAL: PAINLEVEL: 0

## 2024-10-01 ASSESSMENT — COGNITIVE AND FUNCTIONAL STATUS - GENERAL: TRAIL MAKING TEST: PATIENT COMPLETES TRAIL MAKING TEST PROPERLY.

## 2024-10-02 ENCOUNTER — OFFICE VISIT (OUTPATIENT)
Dept: PHYSICAL THERAPY | Age: 74
End: 2024-10-02
Attending: STUDENT IN AN ORGANIZED HEALTH CARE EDUCATION/TRAINING PROGRAM
Payer: MEDICARE

## 2024-10-02 PROCEDURE — 97112 NEUROMUSCULAR REEDUCATION: CPT

## 2024-10-02 PROCEDURE — 97110 THERAPEUTIC EXERCISES: CPT

## 2024-10-02 NOTE — PROGRESS NOTES
Diagnosis:   Chronic bilateral low back pain without sciatica (M54.50,G89.29)  Chronic bilateral thoracic back pain (M54.6,G89.29)  History of compression fracture of spine (Z87.81)  Bilateral carpal tunnel syndrome (G56.03)      Referring Provider: Drew Crain  Date of Evaluation:    9/5/2024    Precautions:  None Next MD visit:   none scheduled  Date of Surgery: n/a   Insurance Primary/Secondary: MEDICARE / [employer] [place] INDEMNITY     # Auth Visits: 10 visits auth until 12/4/2024            Subjective: Patient reports that  her back bothers her the most with bending and reaching .  The wrist and hand symptoms are about the same as they have.    Pain: 0/10      Objective: See treatment log    AROM:   Wrist flex: R 62, L 77  Wrist ext R 71, L 72    Sensation UE: no deficits to light touch and symmetrical    Assessment:    Progressed this sessio with nerve glides and additional proximal strenght to see if this helps with her wrist and hand symptoms.  Also progressed res with core strengthening as that will help to stabilize the lumbar spine to decrease pain and symptoms with movements in and out of flexion; needs reinforcement.        Goals:   Goals: (to be met in 10 visits)   1. Patient will be independent in a progressive HEP to help manage symptoms and achieve functional independence in 3 sessions.  2. Patient will decrease his max pain to 4/10 as needed to improve his functional independence in 3 weeks.  3. Patient will increase her lower abdominal strength to 1+/5 as needed to improve her lumbar stability with prolonged standing  4. Patient will increase her glut med strength to 4-/5 or b=greater as needed to improve her lumbopelvic stability for decreased pain with walking.  5. Patient will increase her low ab strenght to 2-/5 to help improve her lumbar endurance with walking in the community without use of a cart.  6. Patient will increase her lumbar flexion range to WFL and pain free to allow her to bend to reach  for items during daily chores or in the garden  7. Patient will increase her SLB to greater than 15 sec bilaterally to improve her safety and stability with walking community distances.     Plan: Continue with progressive strengthening and flexibility; reassess nerve glides and continue with lateral walks and trial repeated extension to manage flexion based symptoms.     Date: 9/25/24                TX#: 4/10 Date: 10/2/24                TX#: 5/10   Man     Ther ex   RSB DKTC x 15  RSB LTR x 10  Figure 4 stretch with push 2 x 20\" R/L      Seated clams 2 x 10 RTB  Seated marches 2 x 10 RTB    Wrist extension stretch 3 x 10\" R/L  Wrist flexion stretch 3 x 10\" R  Rows 2x 10 GTB    Standing hip abduction 2 x 10 R/L  Standing hip extension 1 x 10 R/L  Marches on Airex x 20  Pallof punches x 10 R/L GTT   RSB DKTC x 15  RSB LTR x 10  Figure 4 stretch with push 2 x 20\" R/L      Seated clams 2 x 10 RTB  Seated marches 2 x 10 RTB  Resisted ER with retraction YTB 2 x 10    Wrist extension stretch 3 x 10\" R/L ND  Wrist flexion stretch 3 x 10\" R  ND  Rows 2x 10 GTB       Standing hip abduction 2 x 10 R/L  Standing hip extension 1 x 10 R/L  Marches on Airex x 20  Pallof punches x 10 R/L GTT   NMR TA isometric 2 x 10, 3\"  Supine marches 2 x 10  Low ab level 1: x 10  Supine isometric adduction 2 x 10, 3\"    SLB 2 x 20\" R/L TA isometric x 15, 3\"  Supine marches x15  Low ab level 1: x 15  Supine isometric adduction 2 x 10, 3\"    Tandem balance 1 X 30\"  R/L  SLB 2 x 30\" R/L   Ther act     HEP: IE:  Supine Transversus Abdominis Bracing - Hands on Stomach  - 1 x daily - 7 x weekly - 2 sets - 10 reps - 3 hold  - Supine Figure 4 Piriformis Stretch  - 1 x daily - 7 x weekly - 1 sets - 3 reps - 20 sec hold  - Seated Hip Abduction  - 1 x daily - 7 x weekly - 3 sets - 10 reps  - Seated March with Resistance  - 1 x daily - 7 x weekly - 3 sets - 10 reps  9/20 supine marches, standing hip abd, wrist ext  10/2 median nerve glide and ER with  retraction  Charges: 1 NMR (12 min) 2 ther ex (30 min)        Total Timed Treatment: 42 min  Total Treatment Time: 42 min    Certification From: 9/5/2024  To:12/4/2024

## 2024-10-09 ENCOUNTER — OFFICE VISIT (OUTPATIENT)
Dept: PHYSICAL THERAPY | Age: 74
End: 2024-10-09
Payer: MEDICARE

## 2024-10-09 PROCEDURE — 97112 NEUROMUSCULAR REEDUCATION: CPT

## 2024-10-09 PROCEDURE — 97110 THERAPEUTIC EXERCISES: CPT

## 2024-10-09 NOTE — PROGRESS NOTES
Diagnosis:   Chronic bilateral low back pain without sciatica (M54.50,G89.29)  Chronic bilateral thoracic back pain (M54.6,G89.29)  History of compression fracture of spine (Z87.81)  Bilateral carpal tunnel syndrome (G56.03)      Referring Provider: No ref. provider found  Date of Evaluation:    9/5/2024    Precautions:  None Next MD visit:   none scheduled  Date of Surgery: n/a   Insurance Primary/Secondary: MEDICARE / [employer] [place] INDEMNITY     # Auth Visits: 10 visits auth until 12/4/2024            Subjective: Patient reports that her lower back and mid back are feeling ok today. She really has not done much cooking so her wrist and hand symptoms have not been aggravated. She did bring her walker up stairs and she walked yesterday throughout her caul[place]ack     Pain: 0/10      Objective: See treatment log    AROM:   Wrist flex: R 62, L 77  Wrist ext R 71, L 72    Sensation UE: no deficits to light touch and symmetrical    Assessment:  Patient had good performance of nerve glides without symptoms with verbal cues on demonstration. She is making good gains in her core stability as that will help to decrease overuse to the low back. She does have moderate LOB with SLB; however,r she has improved balance with a narrow PADMINI. Need to continue with additional strengthening and balance training to allow her good lumbar and thoracic support to improve her safe, fucntional mobility      Goals:   Goals: (to be met in 10 visits)   1. Patient will be independent in a progressive HEP to help manage symptoms and achieve functional independence in 3 sessions.  2. Patient will decrease his max pain to 4/10 as needed to improve his functional independence in 3 weeks.  3. Patient will increase her lower abdominal strength to 1+/5 as needed to improve her lumbar stability with prolonged standing  4. Patient will increase her glut med strength to 4-/5 or b=greater as needed to improve her lumbopelvic stability for decreased pain with  walking.  5. Patient will increase her low ab strenght to 2-/5 to help improve her lumbar endurance with walking in the community without use of a cart.  6. Patient will increase her lumbar flexion range to WFL and pain free to allow her to bend to reach for items during daily chores or in the garden  7. Patient will increase her SLB to greater than 15 sec bilaterally to improve her safety and stability with walking community distances.     Plan: Continue with progressive strengthening and flexibility; continue monster walks and trial repeated extension to manage flexion based symptoms.     Date: 10/2/24                TX#: 5/10 Date: 10/9/24                TX#: 6/10   Man     Ther ex   RSB DKTC x 15  RSB LTR x 10  Figure 4 stretch with push 2 x 20\" R/L      Seated clams 2 x 10 RTB  Seated marches 2 x 10 RTB  Resisted ER with retraction YTB 2 x 10    Wrist extension stretch 3 x 10\" R/L ND  Wrist flexion stretch 3 x 10\" R  ND  Rows 2x 10 GTB       Standing hip abduction 2 x 10 R/L  Standing hip extension 1 x 10 R/L  Marches on Airex x 20  Pallof punches x 10 R/L GTT   RSB DKTC x 15  RSB LTR x 10  Figure 4 stretch with push 2 x 20\" R/L      Seated clams 2 x 10 RTB  Seated marches 2 x 10 RTB  Resisted ER with retraction YTB 2 x 10    Rows 2x 10 GTB       Standing hip abduction 2 x 10 R/L  Standing hip extension 1 x 10 R/L  Marches on Airex x 20  Pallof punches x 10 R/L GTT  Lateral walk 2 laps (5 steps)   NMR TA isometric x 15, 3\"  Supine marches x15  Low ab level 1: x 15  Supine isometric adduction 2 x 10, 3\"    Tandem balance 1 X 30\"  R/L  SLB 2 x 30\" R/L TA isometric x 15, 3\"  Supine marches x15  Low ab level 1: x 15  Supine isometric adduction 2 x 10, 3\"    Romberg balance x 1 min AIrex  Tandem balance 1 X 30\"  R/L  SLB 2 x 30\" R/L  Median nerve glide with wrist ext x 10 R/L    Ther act     HEP: IE:  Supine Transversus Abdominis Bracing - Hands on Stomach  - 1 x daily - 7 x weekly - 2 sets - 10 reps - 3 hold  -  Supine Figure 4 Piriformis Stretch  - 1 x daily - 7 x weekly - 1 sets - 3 reps - 20 sec hold  - Seated Hip Abduction  - 1 x daily - 7 x weekly - 3 sets - 10 reps  - Seated March with Resistance  - 1 x daily - 7 x weekly - 3 sets - 10 reps  9/20 supine marches, standing hip abd, wrist ext  10/2 median nerve glide and ER with retraction  10/9 lat walks  Charges: 1 NMR (12 min) 2 ther ex (30 min)        Total Timed Treatment: 42 min  Total Treatment Time: 43 min    Certification From: 9/5/2024  To:12/4/2024

## 2024-10-16 ENCOUNTER — APPOINTMENT (OUTPATIENT)
Dept: PHYSICAL THERAPY | Age: 74
End: 2024-10-16
Payer: MEDICARE

## 2024-10-23 ENCOUNTER — OFFICE VISIT (OUTPATIENT)
Dept: PHYSICAL THERAPY | Age: 74
End: 2024-10-23
Attending: STUDENT IN AN ORGANIZED HEALTH CARE EDUCATION/TRAINING PROGRAM
Payer: MEDICARE

## 2024-10-23 PROCEDURE — 97110 THERAPEUTIC EXERCISES: CPT

## 2024-10-23 PROCEDURE — 97112 NEUROMUSCULAR REEDUCATION: CPT

## 2024-10-23 NOTE — PROGRESS NOTES
Diagnosis:   Chronic bilateral low back pain without sciatica (M54.50,G89.29)  Chronic bilateral thoracic back pain (M54.6,G89.29)  History of compression fracture of spine (Z87.81)  Bilateral carpal tunnel syndrome (G56.03)      Referring Provider: No ref. provider found  Date of Evaluation:    9/5/2024    Precautions:  None Next MD visit:   none scheduled  Date of Surgery: n/a   Insurance Primary/Secondary: MEDICARE / [employer] [place] INDEMN[place]     # Auth Visits: 10 visits auth until 12/4/2024            Subjective: Patient reports that  she is still having pain in the low back with bending forward during activities; this occurs during the activity and then subsides.  She has been walking more overall. She has not really done her exercises     Pain: 0/10      Objective: See treatment log    AROM:   Wrist flex: R 62, L 77  Wrist ext R 71, L 72    Sensation UE: no deficits to light touch and symmetrical    Assessment:   Continued this visit with repeated extension in standing to help promote decrease pain with movement into flexion; she did not have pain with full flexion as her comparable sign following extension.  Continued with balance this visit and she had less LOB with a narrow PADMINI and progressed to a more unstable surface. Continued with progressive strengthening to improve her lumbopelvic stability.       Goals:   Goals: (to be met in 10 visits)   1. Patient will be independent in a progressive HEP to help manage symptoms and achieve functional independence in 3 sessions.  2. Patient will decrease his max pain to 4/10 as needed to improve his functional independence in 3 weeks.  3. Patient will increase her lower abdominal strength to 1+/5 as needed to improve her lumbar stability with prolonged standing  4. Patient will increase her glut med strength to 4-/5 or b=greater as needed to improve her lumbopelvic stability for decreased pain with walking.  5. Patient will increase her low ab strenght to 2-/5 to help  improve her lumbar endurance with walking in the community without use of a cart.  6. Patient will increase her lumbar flexion range to WFL and pain free to allow her to bend to reach for items during daily chores or in the garden  7. Patient will increase her SLB to greater than 15 sec bilaterally to improve her safety and stability with walking community distances.     Plan: Continue with progressive strengthening and flexibility; continue monster walks and continue with repeated extension to improve pain with flexion.     Date: 10/9/24                TX#: 6/10 Date: 10/23/24                TX#: 7/10   Man     Ther ex   RSB DKTC x 15  RSB LTR x 10  Figure 4 stretch with push 2 x 20\" R/L      Seated clams 2 x 10 RTB  Seated marches 2 x 10 RTB  Resisted ER with retraction YTB 2 x 10    Rows 2x 10 GTB       Standing hip abduction 2 x 10 R/L  Standing hip extension 1 x 10 R/L  Marches on Airex x 20  Pallof punches x 10 R/L GTT  Lateral walk 2 laps (5 steps)   RSB DKTC x 15  RSB LTR x 10  Figure 4 stretch with push 2 x 20\" R/L      Seated clams 2 x 10 RTB  Seated marches 2 x 10 RTB  Resisted ER with retraction YTB 2 x 10    Rows 2x 10 GTB       Standing hip abduction 2 x 10 R/L  Standing hip extension 1 x 10 R/L  Marches on Airex x 20  Pallof punches x 10 R/L GTT   Lateral walk 2 laps (5 steps)  Repeated extension in standing at plinth 1 x 10    NMR TA isometric x 15, 3\"  Supine marches x15  Low ab level 1: x 15  Supine isometric adduction 2 x 10, 3\"    Romberg balance x 1 min Airex  Tandem balance 1 X 30\"  R/L  SLB 2 x 30\" R/L  Median nerve glide with wrist ext x 10 R/L  TA isometric x 15, 3\"  Supine marches x15  Low ab level 1: 2 x 10   Supine isometric adduction 2 x 10, 3\"    Romberg balance x 30 sec  Airex  Tandem balance 1 X 30\"  R/L Airex   SLB 2 x 30\" R/L  Median nerve glide with wrist ext x 10 R/L    Ther act     HEP: IE:  Supine Transversus Abdominis Bracing - Hands on Stomach  - 1 x daily - 7 x weekly - 2  sets - 10 reps - 3 hold  - Supine Figure 4 Piriformis Stretch  - 1 x daily - 7 x weekly - 1 sets - 3 reps - 20 sec hold  - Seated Hip Abduction  - 1 x daily - 7 x weekly - 3 sets - 10 reps  - Seated March with Resistance  - 1 x daily - 7 x weekly - 3 sets - 10 reps  9/20 supine marches, standing hip abd, wrist ext  10/2 median nerve glide and ER with retraction  10/9 lat walks  10/23 repeated standing ext  Charges: 1 NMR (12 min) 2 ther ex (29 min)        Total Timed Treatment: 41 min  Total Treatment Time: 43 min    Certification From: 9/5/2024  To:12/4/2024

## 2024-10-30 ENCOUNTER — APPOINTMENT (OUTPATIENT)
Dept: PHYSICAL THERAPY | Age: 74
End: 2024-10-30
Attending: STUDENT IN AN ORGANIZED HEALTH CARE EDUCATION/TRAINING PROGRAM
Payer: MEDICARE

## 2024-10-30 ENCOUNTER — TELEPHONE (OUTPATIENT)
Dept: PHYSICAL THERAPY | Facility: HOSPITAL | Age: 74
End: 2024-10-30

## 2024-10-30 NOTE — PROGRESS NOTES
Diagnosis:   Chronic bilateral low back pain without sciatica (M54.50,G89.29)  Chronic bilateral thoracic back pain (M54.6,G89.29)  History of compression fracture of spine (Z87.81)  Bilateral carpal tunnel syndrome (G56.03)      Referring Provider: No ref. provider found  Date of Evaluation:    9/5/2024    Precautions:  None Next MD visit:   none scheduled  Date of Surgery: n/a   Insurance Primary/Secondary: MEDICARE / [employer] [place]DEMN[place]     # Auth Visits: 10 visits auth until 12/4/2024            Subjective: Patient reports that      Pain: 0/10      Objective: See treatment log    AROM:   Wrist flex: R 62, L 77  Wrist ext R 71, L 72    Sensation UE: no deficits to light touch and symmetrical    Assessment:         Goals:   Goals: (to be met in 10 visits)   1. Patient will be independent in a progressive HEP to help manage symptoms and achieve functional independence in 3 sessions.  2. Patient will decrease his max pain to 4/10 as needed to improve his functional independence in 3 weeks.  3. Patient will increase her lower abdominal strength to 1+/5 as needed to improve her lumbar stability with prolonged standing  4. Patient will increase her glut med strength to 4-/5 or b=greater as needed to improve her lumbopelvic stability for decreased pain with walking.  5. Patient will increase her low ab strenght to 2-/5 to help improve her lumbar endurance with walking in the community without use of a cart.  6. Patient will increase her lumbar flexion range to WFL and pain free to allow her to bend to reach for items during daily chores or in the garden  7. Patient will increase her SLB to greater than 15 sec bilaterally to improve her safety and stability with walking community distances.     Plan: Continue with progressive strengthening and flexibility; continue monster walks and continue with repeated extension to improve pain with flexion.     Date: 10/23/24                TX#: 7/10 Date: 10/30/24                TX#:  8/10   Man     Ther ex   RSB DKTC x 15  RSB LTR x 10  Figure 4 stretch with push 2 x 20\" R/L      Seated clams 2 x 10 RTB  Seated marches 2 x 10 RTB  Resisted ER with retraction YTB 2 x 10    Rows 2x 10 GTB       Standing hip abduction 2 x 10 R/L  Standing hip extension 1 x 10 R/L  Marches on Airex x 20  Pallof punches x 10 R/L GTT   Lateral walk 2 laps (5 steps)  Repeated extension in standing at plinth 1 x 10     NMR TA isometric x 15, 3\"  Supine marches x15  Low ab level 1: 2 x 10   Supine isometric adduction 2 x 10, 3\"    Romberg balance x 30 sec  Airex  Tandem balance 1 X 30\"  R/L Airex   SLB 2 x 30\" R/L  Median nerve glide with wrist ext x 10 R/L     Ther act     HEP: IE:  Supine Transversus Abdominis Bracing - Hands on Stomach  - 1 x daily - 7 x weekly - 2 sets - 10 reps - 3 hold  - Supine Figure 4 Piriformis Stretch  - 1 x daily - 7 x weekly - 1 sets - 3 reps - 20 sec hold  - Seated Hip Abduction  - 1 x daily - 7 x weekly - 3 sets - 10 reps  - Seated March with Resistance  - 1 x daily - 7 x weekly - 3 sets - 10 reps  9/20 supine marches, standing hip abd, wrist ext  10/2 median nerve glide and ER with retraction  10/9 lat walks  10/23 repeated standing ext  Charges: 1 NMR (12 min) 2 ther ex (29 min)        Total Timed Treatment: 41 min  Total Treatment Time: 43 min    Certification From: 9/5/2024  To:12/4/2024

## 2024-11-04 RX ORDER — APIXABAN 5 MG/1
5 TABLET, FILM COATED ORAL EVERY 12 HOURS
Qty: 180 TABLET | Refills: 0 | Status: SHIPPED | OUTPATIENT
Start: 2024-11-04

## 2024-11-06 ENCOUNTER — OFFICE VISIT (OUTPATIENT)
Dept: PHYSICAL THERAPY | Age: 74
End: 2024-11-06
Payer: MEDICARE

## 2024-11-06 PROCEDURE — 97112 NEUROMUSCULAR REEDUCATION: CPT

## 2024-11-06 PROCEDURE — 97110 THERAPEUTIC EXERCISES: CPT

## 2024-11-06 NOTE — PROGRESS NOTES
Discharge Summary  Pt has attended 8 visits in Physical Therapy.       Diagnosis:   Chronic bilateral low back pain without sciatica (M54.50,G89.29)  Chronic bilateral thoracic back pain (M54.6,G89.29)  History of compression fracture of spine (Z87.81)  Bilateral carpal tunnel syndrome (G56.03)      Referring Provider: Drew Crain  Date of Evaluation:    9/5/2024    Precautions:  None Next MD visit:   none scheduled  Date of Surgery: n/a   Insurance Primary/Secondary: MEDICARE / [employer] [place] INDEMNITY     # Auth Visits: 10 visits auth until 12/4/2024            Subjective: Patient reports that her back pain has been ok unless she is reaching and bending forward.  She does have resolution of her pain when she changes positions.  She states that she she has not been cooking as much so she hasn't noticed the pain with cooking, but she still has the hands falling asleep daily if they are in a funny position or driving.  Her max pain has been a 3/10    Current functional limitations include bending forward, gardening, prolonged walking, prolonged standing, uses a shopping cart for walking in the store, not able to lift and carry things. She does have some numbness in the right and left hands with driving   Pain: 0/10      Objective: See treatment log    AROM:   Wrist flex: R 62, L 75  Wrist ext R 77, L 72    Sensation UE: no deficits to light touch and symmetrical    Observation/Posture: Patient stands with minimal anterior pelvic tilt, tall pelvis, min lumbar extension, bilateral femoral MR, and bilateral pronation (R>L).       Cervical AROM WFL and no change in distal symptoms    Lumbar  AROM: (* denotes performed with pain)  Flexion: WFL with central low back pain at end*  Extension: WFL and pain free  Sidebending: R WFL with pivot point at L4 and pain free ; L WFL with pivot point at L4 and pain free  Rotation: R WFL and pain free ; L WFL and pain free    Accessory motion: hip ER and IR WFL bilaterally assessed in  sitting  Palpation: Minimal tender at the bilateral lumbar paraspinals    Strength: (* denotes performed with pain)  LE   Hip flexion (L2): R 4/5; L 4+/5  Hip abduction: R 4/5; L 4/5  Hip Extension: R 3+/5; L 3+/5   Hip ER: R 4+/5; L 4+/5  Hip IR: R 4+/5; L 4+/5  Knee Flexion: R 4/5; L 4+/5   Knee extension (L3): R 4+/5; L 5/5   DF (L4): R 5/5; L 5/5  Great Toe Ext (L5): R 5/5, L 5/5  PF (S1): R 5/5; L 5/5  Lower abdominals: 1+/5  Glut med: R 3+/5, L 3+/5     Flexibility:   LE   Hamstrings: R -15; L -20  Piriformis: R stiff; L stiff     Special tests:   ARLENE: R neg L pos  Sit to stand: use of hands, increased knee valgus and decreased speed  Sit to stand x 10: 30 sec  no hands (40 sec  and use of hands)    Gait: pt ambulates on level ground with increased PADMINI, decreased bilateral push off and increased knee valgus.   Balance: SLS R 4 sec, L 8 sec    Assessment:    Patient has made gains in her range of motion in the lumbar spine and strenght of the proximal hips and lower abdominals as needed to improve her lumbopelvic stability. She continues to need increase hip flexibility and strength to allow her to reach without increasing compensations with the lumbar spine.  She would also benefit from additional balance training to improve her safety and decrease her risk for falls. She would benefit from additional skilled physical therapy interventions and progression of her HEP as needed to return to unrestricted and safe ADLS.       Goals:   Goals: (to be met in 10 visits)   1. Patient will be independent in a progressive HEP to help manage symptoms and achieve functional independence in 3 sessions. MET  2. Patient will decrease his max pain to 4/10 as needed to improve his functional independence in 3 weeks. MET  3. Patient will increase her lower abdominal strength to 1+/5 as needed to improve her lumbar stability with prolonged standing MET  4. Patient will increase her glut med strength to 4-/5 or greater as needed  to improve her lumbopelvic stability for decreased pain with walking. WORKING TOWARD   5. Patient will increase her low ab strenght to 2-/5 to help improve her lumbar endurance with walking in the community without use of a cart. WORKING TOWARD   6. Patient will increase her lumbar flexion range to WFL and pain free to allow her to bend to reach for items during daily chores or in the garden PARTIALLY ACHIEVE  7. Patient will increase her SLB to greater than 15 sec bilaterally to improve her safety and stability with walking community distances. WORKING TOWARD        Plan: Continue skilled Physical Therapy for 2 more session total in the next couple of weeks and then DC to HEP. Treatment will include: manual therapy, range of motion exercises, flexibility exercises, strengthening exercises, postural re-ed, neuromuscular re-education, CKC exercises, balance activities, and HEP instruction all to improve her functional independence         Patient/Family/Caregiver was advised of these findings, precautions, and treatment options and has agreed to actively participate in planning and for this course of care.    Thank you for your referral. If you have any questions, please contact me at Dept: [phone].    Sincerely,  Electronically signed by therapist: Licha Henao PT     Physician's certification required:  Yes  Please co-sign or sign and return this letter via fax as soon as possible to [phone].   I certify the need for these services furnished under this plan of treatment and while under my care.    X___________________________________________________ Date____________________    Certification From: 11/6/2024  To:2/4/2025   Plan: Continue monster walks and continue with repeated extension to improve pain with flexion.     Date: 10/23/24                TX#: 7/10 Date: 11/6/24                TX#: 8/10   Man     Ther ex   RSB DKTC x 15  RSB LTR x 10  Figure 4 stretch with push 2 x 20\"  R/L      Seated clams 2 x 10 RTB  Seated marches 2 x 10 RTB  Resisted ER with retraction YTB 2 x 10    Rows 2x 10 GTB       Standing hip abduction 2 x 10 R/L  Standing hip extension 1 x 10 R/L  Marches on Airex x 20  Pallof punches x 10 R/L GTT   Lateral walk 2 laps (5 steps)  Repeated extension in standing at plinth 1 x 10    RSB DKTC x 15  RSB LTR x 10  Figure 4 stretch with push 2 x 20\" R/L      Seated clams 2 x 10 RTB  Seated marches 2 x 10 RTB  Resisted ER with retraction YTB 2 x 10    Rows 2x 10 GTB       Standing hip abduction 2 x 10 R/L  Standing hip extension 1 x 10 R/L  Marches on Airex x 20  Pallof punches x 10 R/L GTT   Lateral walk 2 laps (5 steps)  Monster walks NV  Repeated extension in standing at plinth 1 x 15    NMR TA isometric x 15, 3\"  Supine marches x15  Low ab level 1: 2 x 10   Supine isometric adduction 2 x 10, 3\"    Romberg balance x 30 sec  Airex  Tandem balance 1 X 30\"  R/L Airex   SLB 2 x 30\" R/L  Median nerve glide with wrist ext x 10 R/L  TA isometric x 15, 3\"  Supine marches x15  Low ab level 1: 2 x 10   Supine isometric adduction 2 x 10, 3\"    Romberg balance x 30 sec  Airex  Tandem balance 1 X 30\"  R/L Airex   SLB 2 x 30\" R/L  Median nerve glide with wrist ext x 10 R/L    Ther act     HEP: IE:  Supine Transversus Abdominis Bracing - Hands on Stomach  - 1 x daily - 7 x weekly - 2 sets - 10 reps - 3 hold  - Supine Figure 4 Piriformis Stretch  - 1 x daily - 7 x weekly - 1 sets - 3 reps - 20 sec hold  - Seated Hip Abduction  - 1 x daily - 7 x weekly - 3 sets - 10 reps  - Seated March with Resistance  - 1 x daily - 7 x weekly - 3 sets - 10 reps  9/20 supine marches, standing hip abd, wrist ext  10/2 median nerve glide and ER with retraction  10/9 lat walks  10/23 repeated standing ext  Charges: 1 NMR (12 min) 2 ther ex (30 min)        Total Timed Treatment: 42 min  Total Treatment Time: 43 min    Certification From: 9/5/2024  To:12/4/2024

## 2024-11-21 ENCOUNTER — APPOINTMENT (OUTPATIENT)
Dept: PHYSICAL THERAPY | Age: 74
End: 2024-11-21
Payer: MEDICARE

## 2024-11-21 ENCOUNTER — TELEPHONE (OUTPATIENT)
Dept: PHYSICAL THERAPY | Facility: HOSPITAL | Age: 74
End: 2024-11-21

## 2024-11-27 ENCOUNTER — APPOINTMENT (OUTPATIENT)
Dept: PHYSICAL THERAPY | Age: 74
End: 2024-11-27
Payer: MEDICARE

## 2025-01-02 ENCOUNTER — TELEPHONE (OUTPATIENT)
Dept: CARDIOLOGY | Age: 75
End: 2025-01-02

## 2025-01-02 DIAGNOSIS — Z79.899 ENCOUNTER FOR MONITORING FLECAINIDE THERAPY: Primary | ICD-10-CM

## 2025-01-02 DIAGNOSIS — I48.0 PAROXYSMAL ATRIAL FIBRILLATION  (CMD): ICD-10-CM

## 2025-01-02 DIAGNOSIS — Z09 HOSPITAL DISCHARGE FOLLOW-UP: ICD-10-CM

## 2025-01-02 DIAGNOSIS — F17.210 SMOKES WITH GREATER THAN 30 PACK YEAR HISTORY: ICD-10-CM

## 2025-01-02 DIAGNOSIS — Z51.81 ENCOUNTER FOR MONITORING FLECAINIDE THERAPY: Primary | ICD-10-CM

## 2025-01-02 RX ORDER — FLECAINIDE ACETATE 100 MG/1
100 TABLET ORAL 2 TIMES DAILY
Qty: 180 TABLET | Refills: 0 | Status: SHIPPED | OUTPATIENT
Start: 2025-01-02

## 2025-01-09 ENCOUNTER — APPOINTMENT (OUTPATIENT)
Dept: CARDIOLOGY | Age: 75
End: 2025-01-09
Attending: INTERNAL MEDICINE

## 2025-01-09 DIAGNOSIS — Z51.81 ENCOUNTER FOR MONITORING FLECAINIDE THERAPY: ICD-10-CM

## 2025-01-09 DIAGNOSIS — Z09 HOSPITAL DISCHARGE FOLLOW-UP: ICD-10-CM

## 2025-01-09 DIAGNOSIS — Z79.899 ENCOUNTER FOR MONITORING FLECAINIDE THERAPY: ICD-10-CM

## 2025-01-09 DIAGNOSIS — F17.210 SMOKES WITH GREATER THAN 30 PACK YEAR HISTORY: ICD-10-CM

## 2025-01-09 DIAGNOSIS — I48.0 PAROXYSMAL ATRIAL FIBRILLATION  (CMD): ICD-10-CM

## 2025-01-09 PROCEDURE — 93000 ELECTROCARDIOGRAM COMPLETE: CPT | Performed by: INTERNAL MEDICINE

## 2025-01-10 LAB
ATRIAL RATE (BPM): 55
P AXIS (DEGREES): 68
PR-INTERVAL (MSEC): 182
QRS-INTERVAL (MSEC): 84
QT-INTERVAL (MSEC): 434
QTC: 415
R AXIS (DEGREES): -27
REPORT TEXT: NORMAL
T AXIS (DEGREES): 29
VENTRICULAR RATE EKG/MIN (BPM): 55

## 2025-01-31 RX ORDER — APIXABAN 5 MG/1
5 TABLET, FILM COATED ORAL EVERY 12 HOURS
Qty: 180 TABLET | Refills: 1 | Status: SHIPPED | OUTPATIENT
Start: 2025-01-31

## 2025-02-21 ENCOUNTER — CLINICAL ABSTRACT (OUTPATIENT)
Dept: INFUSION THERAPY | Age: 75
End: 2025-02-21

## 2025-02-21 DIAGNOSIS — M81.0 SENILE OSTEOPOROSIS: Primary | ICD-10-CM

## 2025-02-21 PROBLEM — E83.42 HYPOMAGNESEMIA: Status: ACTIVE | Noted: 2025-02-21

## 2025-02-21 PROBLEM — E83.39 HYPOPHOSPHATEMIA: Status: ACTIVE | Noted: 2025-02-21

## 2025-02-25 ENCOUNTER — HOSPITAL ENCOUNTER (OUTPATIENT)
Dept: INFUSION THERAPY | Age: 75
Discharge: STILL ADMITTED OR ADMINISTRATIVE | End: 2025-02-25
Attending: INTERNAL MEDICINE

## 2025-02-25 ENCOUNTER — LAB SERVICES (OUTPATIENT)
Dept: LAB | Age: 75
End: 2025-02-25
Attending: INTERNAL MEDICINE

## 2025-02-25 VITALS
TEMPERATURE: 97.2 F | HEART RATE: 54 BPM | BODY MASS INDEX: 26.41 KG/M2 | OXYGEN SATURATION: 96 % | WEIGHT: 158.73 LBS | SYSTOLIC BLOOD PRESSURE: 111 MMHG | DIASTOLIC BLOOD PRESSURE: 67 MMHG | RESPIRATION RATE: 16 BRPM

## 2025-02-25 DIAGNOSIS — E83.39 HYPOPHOSPHATEMIA: ICD-10-CM

## 2025-02-25 DIAGNOSIS — E83.42 HYPOMAGNESEMIA: ICD-10-CM

## 2025-02-25 DIAGNOSIS — M81.0 SENILE OSTEOPOROSIS: Primary | ICD-10-CM

## 2025-02-25 DIAGNOSIS — M81.0 SENILE OSTEOPOROSIS: ICD-10-CM

## 2025-02-25 LAB
ALBUMIN SERPL-MCNC: 3.4 G/DL (ref 3.4–5)
ALBUMIN/GLOB SERPL: 1.1 {RATIO} (ref 1–2.4)
ALP SERPL-CCNC: 46 UNITS/L (ref 45–117)
ALT SERPL-CCNC: 17 UNITS/L
ANION GAP SERPL CALC-SCNC: 8 MMOL/L (ref 7–19)
AST SERPL-CCNC: 11 UNITS/L
BILIRUB SERPL-MCNC: 0.8 MG/DL (ref 0.2–1)
BUN SERPL-MCNC: 10 MG/DL (ref 6–20)
BUN/CREAT SERPL: 15 (ref 7–25)
CALCIUM SERPL-MCNC: 8.9 MG/DL (ref 8.4–10.2)
CHLORIDE SERPL-SCNC: 105 MMOL/L (ref 97–110)
CO2 SERPL-SCNC: 27 MMOL/L (ref 21–32)
CREAT SERPL-MCNC: 0.66 MG/DL (ref 0.51–0.95)
EGFRCR SERPLBLD CKD-EPI 2021: >90 ML/MIN/{1.73_M2}
FASTING DURATION TIME PATIENT: ABNORMAL H
GLOBULIN SER-MCNC: 3.2 G/DL (ref 2–4)
GLUCOSE SERPL-MCNC: 101 MG/DL (ref 70–99)
MAGNESIUM SERPL-MCNC: 2.1 MG/DL (ref 1.7–2.4)
PHOSPHATE SERPL-MCNC: 2.8 MG/DL (ref 2.4–4.7)
POTASSIUM SERPL-SCNC: 3.8 MMOL/L (ref 3.4–5.1)
PROT SERPL-MCNC: 6.6 G/DL (ref 6.4–8.2)
SODIUM SERPL-SCNC: 136 MMOL/L (ref 135–145)

## 2025-02-25 PROCEDURE — 10002800 HB RX 250 W HCPCS: Performed by: INTERNAL MEDICINE

## 2025-02-25 PROCEDURE — 80053 COMPREHEN METABOLIC PANEL: CPT

## 2025-02-25 PROCEDURE — 83735 ASSAY OF MAGNESIUM: CPT

## 2025-02-25 PROCEDURE — 96372 THER/PROPH/DIAG INJ SC/IM: CPT | Performed by: INTERNAL MEDICINE

## 2025-02-25 PROCEDURE — 84100 ASSAY OF PHOSPHORUS: CPT

## 2025-02-25 RX ADMIN — DENOSUMAB 60 MG: 60 INJECTION SUBCUTANEOUS at 15:27

## 2025-02-25 ASSESSMENT — PAIN SCALES - GENERAL: PAINLEVEL_OUTOF10: 0

## 2025-02-28 RX ORDER — METOPROLOL TARTRATE 25 MG/1
25 TABLET, FILM COATED ORAL DAILY
Qty: 90 TABLET | Refills: 3 | Status: SHIPPED | OUTPATIENT
Start: 2025-02-28

## 2025-03-04 ENCOUNTER — APPOINTMENT (OUTPATIENT)
Dept: CT IMAGING | Age: 75
End: 2025-03-04
Attending: FAMILY MEDICINE

## 2025-03-06 ENCOUNTER — HOSPITAL ENCOUNTER (OUTPATIENT)
Dept: CT IMAGING | Age: 75
Discharge: HOME | End: 2025-03-06
Attending: FAMILY MEDICINE

## 2025-03-06 DIAGNOSIS — Z12.31 VISIT FOR SCREENING MAMMOGRAM: ICD-10-CM

## 2025-03-06 PROCEDURE — 77063 BREAST TOMOSYNTHESIS BI: CPT

## 2025-03-10 ENCOUNTER — E-ADVICE (OUTPATIENT)
Dept: FAMILY MEDICINE | Age: 75
End: 2025-03-10

## 2025-03-31 RX ORDER — FLECAINIDE ACETATE 100 MG/1
100 TABLET ORAL 2 TIMES DAILY
Qty: 180 TABLET | Refills: 0 | Status: SHIPPED | OUTPATIENT
Start: 2025-03-31

## 2025-05-05 ENCOUNTER — TELEPHONE (OUTPATIENT)
Dept: FAMILY MEDICINE | Age: 75
End: 2025-05-05

## 2025-05-07 ENCOUNTER — OFFICE VISIT (OUTPATIENT)
Dept: FAMILY MEDICINE | Age: 75
End: 2025-05-07

## 2025-05-07 VITALS
SYSTOLIC BLOOD PRESSURE: 119 MMHG | DIASTOLIC BLOOD PRESSURE: 72 MMHG | BODY MASS INDEX: 26.33 KG/M2 | HEIGHT: 65 IN | TEMPERATURE: 96 F | WEIGHT: 158 LBS | RESPIRATION RATE: 16 BRPM | HEART RATE: 60 BPM

## 2025-05-07 DIAGNOSIS — Z12.2 SCREENING FOR LUNG CANCER: ICD-10-CM

## 2025-05-07 DIAGNOSIS — Z87.891 PERSONAL HISTORY OF SMOKING: ICD-10-CM

## 2025-05-07 DIAGNOSIS — G57.93 NEUROPATHY OF BOTH FEET: ICD-10-CM

## 2025-05-07 DIAGNOSIS — G56.03 BILATERAL CARPAL TUNNEL SYNDROME: ICD-10-CM

## 2025-05-07 DIAGNOSIS — M54.41 CHRONIC BILATERAL LOW BACK PAIN WITH BILATERAL SCIATICA: Primary | ICD-10-CM

## 2025-05-07 DIAGNOSIS — G89.29 CHRONIC BILATERAL LOW BACK PAIN WITH BILATERAL SCIATICA: Primary | ICD-10-CM

## 2025-05-07 DIAGNOSIS — M54.42 CHRONIC BILATERAL LOW BACK PAIN WITH BILATERAL SCIATICA: Primary | ICD-10-CM

## 2025-05-07 PROCEDURE — 99214 OFFICE O/P EST MOD 30 MIN: CPT | Performed by: FAMILY MEDICINE

## 2025-05-07 ASSESSMENT — ENCOUNTER SYMPTOMS
HEMATOLOGIC/LYMPHATIC NEGATIVE: 1
ALLERGIC/IMMUNOLOGIC NEGATIVE: 1
SHORTNESS OF BREATH: 0
NUMBNESS: 1
CONSTITUTIONAL NEGATIVE: 1
PSYCHIATRIC NEGATIVE: 1
GASTROINTESTINAL NEGATIVE: 1
WEAKNESS: 1
CHEST TIGHTNESS: 0
EYES NEGATIVE: 1
BACK PAIN: 1
ENDOCRINE NEGATIVE: 1
RESPIRATORY NEGATIVE: 1

## 2025-05-07 ASSESSMENT — PATIENT HEALTH QUESTIONNAIRE - PHQ9
SUM OF ALL RESPONSES TO PHQ9 QUESTIONS 1 AND 2: 0
SUM OF ALL RESPONSES TO PHQ9 QUESTIONS 1 AND 2: 0
2. FEELING DOWN, DEPRESSED OR HOPELESS: NOT AT ALL
CLINICAL INTERPRETATION OF PHQ2 SCORE: NO FURTHER SCREENING NEEDED
1. LITTLE INTEREST OR PLEASURE IN DOING THINGS: NOT AT ALL

## 2025-05-08 ENCOUNTER — RESULTS FOLLOW-UP (OUTPATIENT)
Dept: FAMILY MEDICINE | Age: 75
End: 2025-05-08

## 2025-05-08 DIAGNOSIS — M48.07 SPINAL STENOSIS OF LUMBOSACRAL REGION: Primary | ICD-10-CM

## 2025-05-08 DIAGNOSIS — M54.32 BILATERAL SCIATICA: ICD-10-CM

## 2025-05-08 DIAGNOSIS — M54.31 BILATERAL SCIATICA: ICD-10-CM

## 2025-05-08 LAB — TSH SERPL-ACNC: 3.24 MCUNITS/ML (ref 0.35–5)

## 2025-05-14 ENCOUNTER — HOSPITAL ENCOUNTER (OUTPATIENT)
Dept: MRI IMAGING | Age: 75
Discharge: HOME | End: 2025-05-14
Attending: FAMILY MEDICINE

## 2025-05-14 DIAGNOSIS — G89.29 CHRONIC BILATERAL LOW BACK PAIN WITH BILATERAL SCIATICA: ICD-10-CM

## 2025-05-14 DIAGNOSIS — M54.41 CHRONIC BILATERAL LOW BACK PAIN WITH BILATERAL SCIATICA: ICD-10-CM

## 2025-05-14 DIAGNOSIS — M54.42 CHRONIC BILATERAL LOW BACK PAIN WITH BILATERAL SCIATICA: ICD-10-CM

## 2025-05-14 DIAGNOSIS — G57.93 NEUROPATHY OF BOTH FEET: ICD-10-CM

## 2025-05-14 PROCEDURE — 72148 MRI LUMBAR SPINE W/O DYE: CPT

## 2025-05-15 ENCOUNTER — APPOINTMENT (OUTPATIENT)
Dept: URBAN - METROPOLITAN AREA CLINIC 248 | Age: 75
Setting detail: DERMATOLOGY
End: 2025-05-15

## 2025-05-15 ENCOUNTER — HOSPITAL ENCOUNTER (OUTPATIENT)
Dept: CT IMAGING | Age: 75
Discharge: HOME | End: 2025-05-15
Attending: FAMILY MEDICINE

## 2025-05-15 ENCOUNTER — E-ADVICE (OUTPATIENT)
Dept: FAMILY MEDICINE | Age: 75
End: 2025-05-15

## 2025-05-15 DIAGNOSIS — Z71.89 OTHER SPECIFIED COUNSELING: ICD-10-CM

## 2025-05-15 DIAGNOSIS — L82.0 INFLAMED SEBORRHEIC KERATOSIS: ICD-10-CM

## 2025-05-15 DIAGNOSIS — D22 MELANOCYTIC NEVI: ICD-10-CM

## 2025-05-15 DIAGNOSIS — L30.4 ERYTHEMA INTERTRIGO: ICD-10-CM

## 2025-05-15 DIAGNOSIS — L82.1 OTHER SEBORRHEIC KERATOSIS: ICD-10-CM

## 2025-05-15 DIAGNOSIS — L81.4 OTHER MELANIN HYPERPIGMENTATION: ICD-10-CM

## 2025-05-15 DIAGNOSIS — Z12.2 SCREENING FOR LUNG CANCER: ICD-10-CM

## 2025-05-15 DIAGNOSIS — D18.0 HEMANGIOMA: ICD-10-CM

## 2025-05-15 DIAGNOSIS — Z87.891 PERSONAL HISTORY OF SMOKING: ICD-10-CM

## 2025-05-15 DIAGNOSIS — L57.0 ACTINIC KERATOSIS: ICD-10-CM

## 2025-05-15 PROBLEM — D22.5 MELANOCYTIC NEVI OF TRUNK: Status: ACTIVE | Noted: 2025-05-15

## 2025-05-15 PROBLEM — D18.01 HEMANGIOMA OF SKIN AND SUBCUTANEOUS TISSUE: Status: ACTIVE | Noted: 2025-05-15

## 2025-05-15 PROCEDURE — OTHER MIPS QUALITY: OTHER

## 2025-05-15 PROCEDURE — 17110 DESTRUCT B9 LESION 1-14: CPT

## 2025-05-15 PROCEDURE — OTHER LIQUID NITROGEN: OTHER

## 2025-05-15 PROCEDURE — 99213 OFFICE O/P EST LOW 20 MIN: CPT | Mod: 25

## 2025-05-15 PROCEDURE — 17000 DESTRUCT PREMALG LESION: CPT | Mod: 59

## 2025-05-15 PROCEDURE — OTHER PRESCRIPTION: OTHER

## 2025-05-15 PROCEDURE — OTHER PRESCRIPTION MEDICATION MANAGEMENT: OTHER

## 2025-05-15 PROCEDURE — OTHER COUNSELING: OTHER

## 2025-05-15 PROCEDURE — 71271 CT THORAX LUNG CANCER SCR C-: CPT

## 2025-05-15 PROCEDURE — 17003 DESTRUCT PREMALG LES 2-14: CPT | Mod: 59

## 2025-05-15 RX ORDER — NYSTATIN 100000 U/G
CREAM TOPICAL
Qty: 30 | Refills: 2 | Status: ERX | COMMUNITY
Start: 2025-05-15

## 2025-05-15 RX ORDER — TRIAMCINOLONE ACETONIDE 1 MG/G
CREAM TOPICAL
Qty: 30 | Refills: 2 | Status: ERX | COMMUNITY
Start: 2025-05-15

## 2025-05-15 ASSESSMENT — LOCATION SIMPLE DESCRIPTION DERM
LOCATION SIMPLE: LEFT BREAST
LOCATION SIMPLE: LEFT UPPER BACK
LOCATION SIMPLE: LEFT CHEEK
LOCATION SIMPLE: RIGHT BREAST
LOCATION SIMPLE: RIGHT LOWER BACK
LOCATION SIMPLE: ABDOMEN
LOCATION SIMPLE: LEFT SCALP
LOCATION SIMPLE: NOSE
LOCATION SIMPLE: INFERIOR FOREHEAD

## 2025-05-15 ASSESSMENT — LOCATION ZONE DERM
LOCATION ZONE: SCALP
LOCATION ZONE: TRUNK
LOCATION ZONE: FACE
LOCATION ZONE: NOSE

## 2025-05-15 ASSESSMENT — LOCATION DETAILED DESCRIPTION DERM
LOCATION DETAILED: EPIGASTRIC SKIN
LOCATION DETAILED: RIGHT INFERIOR LATERAL MIDBACK
LOCATION DETAILED: LEFT MID-UPPER BACK
LOCATION DETAILED: RIGHT LATERAL BREAST 6-7:00 REGION
LOCATION DETAILED: NASAL DORSUM
LOCATION DETAILED: LEFT SUPERIOR LATERAL MALAR CHEEK
LOCATION DETAILED: LEFT MEDIAL FRONTAL SCALP
LOCATION DETAILED: LEFT SUPERIOR MEDIAL UPPER BACK
LOCATION DETAILED: NASAL INFRATIP
LOCATION DETAILED: INFERIOR MID FOREHEAD
LOCATION DETAILED: LEFT MEDIAL BREAST 6-7:00 REGION

## 2025-05-19 ENCOUNTER — E-ADVICE (OUTPATIENT)
Dept: FAMILY MEDICINE | Age: 75
End: 2025-05-19

## 2025-05-29 ENCOUNTER — OFFICE VISIT (OUTPATIENT)
Dept: SURGERY | Facility: CLINIC | Age: 75
End: 2025-05-29
Payer: MEDICARE

## 2025-05-29 ENCOUNTER — TELEPHONE (OUTPATIENT)
Dept: SURGERY | Facility: CLINIC | Age: 75
End: 2025-05-29

## 2025-05-29 ENCOUNTER — HOSPITAL ENCOUNTER (OUTPATIENT)
Dept: GENERAL RADIOLOGY | Facility: HOSPITAL | Age: 75
Discharge: HOME | End: 2025-05-29
Attending: STUDENT IN AN ORGANIZED HEALTH CARE EDUCATION/TRAINING PROGRAM
Payer: MEDICARE

## 2025-05-29 VITALS
BODY MASS INDEX: 24.59 KG/M2 | SYSTOLIC BLOOD PRESSURE: 124 MMHG | DIASTOLIC BLOOD PRESSURE: 72 MMHG | WEIGHT: 153 LBS | HEIGHT: 66 IN

## 2025-05-29 DIAGNOSIS — M54.50 CHRONIC BILATERAL LOW BACK PAIN WITHOUT SCIATICA: ICD-10-CM

## 2025-05-29 DIAGNOSIS — Z87.81 HISTORY OF COMPRESSION FRACTURE OF SPINE: ICD-10-CM

## 2025-05-29 DIAGNOSIS — G89.29 CHRONIC BILATERAL LOW BACK PAIN WITHOUT SCIATICA: ICD-10-CM

## 2025-05-29 DIAGNOSIS — M54.9 MUSCULOSKELETAL BACK PAIN: ICD-10-CM

## 2025-05-29 DIAGNOSIS — M21.70 LEG LENGTH DISCREPANCY: ICD-10-CM

## 2025-05-29 DIAGNOSIS — M54.9 MUSCULOSKELETAL BACK PAIN: Primary | ICD-10-CM

## 2025-05-29 DIAGNOSIS — M51.369 DEGENERATION OF INTERVERTEBRAL DISC OF LUMBAR REGION WITHOUT DISCOGENIC BACK PAIN OR LOWER EXTREMITY PAIN: ICD-10-CM

## 2025-05-29 PROCEDURE — 77073 BONE LENGTH STUDIES: CPT | Performed by: STUDENT IN AN ORGANIZED HEALTH CARE EDUCATION/TRAINING PROGRAM

## 2025-05-29 PROCEDURE — 72082 X-RAY EXAM ENTIRE SPI 2/3 VW: CPT | Performed by: STUDENT IN AN ORGANIZED HEALTH CARE EDUCATION/TRAINING PROGRAM

## 2025-05-29 PROCEDURE — 99205 OFFICE O/P NEW HI 60 MIN: CPT | Performed by: STUDENT IN AN ORGANIZED HEALTH CARE EDUCATION/TRAINING PROGRAM

## 2025-05-29 NOTE — TELEPHONE ENCOUNTER
Patient brought in imaging disc and 1 report. Uploaded to PACS, copy made of report and sent to scanning.    - MRI lumbar spine wo contrast, 5/14/25, done at [facility]

## 2025-05-29 NOTE — PROGRESS NOTES
New patient presents for bilateral low back pain that has been getting progressively worse over time. Admits to radiating symptoms such as leg pain, foot pain, and foot numbness bilaterally. Patient previously saw Celestino Crain on 7/10/2024 in which he said neurosurgery intervention was not indicated; he gave her a referral for physiatry at the time. She has not seen anyone at physiatry.     Patient also admits to neck pain and radiating symptoms in the hands and arms. This is new since the LOV. No cervical spine imaging.     Injections: none  PT: yes; did not help  Meds: none    Patient has h/p compression fractures, osteoporosis- patient is on Prolia and sees endo, h/o carpal tunnel bilaterally,      The following individual(s) verbally consented to be recorded using ambient AI listening technology and understand that they can each withdraw their consent to this listening technology at any point by asking the clinician to turn off or pause the recording:    Patient name: Antonia Gonzalez  Additional names:  fabian Verdugo    Lumbar spine MRI report:   \"  FINDINGS:    For the purposes of this report, the iliac crests are at the level of  L4-L5.    There is osteoporosis. No scoliosis. There is loss of height of the L5, L4,  and L3 vertebral bodies in keeping with chronic osteoporotic compression  fractures. Height loss at L5 is moderate. Mild height loss at L4. Minimal  height loss L3. Other vertebral bodies are preserved in height. Multilevel  disc spurring at the margins of the vertebral bodies. 1 mm retrolisthesis  at L2-L3. 2 mm retrolisthesis L3-L4. 2 mm anterolisthesis L4-L5.    Unremarkable appearance of the lower spinal cord. No nodularity of the  nerve roots of the cauda equina.    Severe loss of intervertebral disc height at L2-L3. Other intervertebral  disc heights are well-maintained. There are type I degenerative endplate  changes/marrow edema [id number] eccentric to the left and at  L2-L3 with  degenerative bone erosions.    Diffuse disc desiccation.  No fracture. No pathologic marrow replacement.  No epidural fluid collection.    T12-L1: Mild bilateral hypertrophic facet changes. Central annular fissure.  Bulging of the annulus. The canal is patent. Bilateral foramina are patent.  L1-L2: Minimal disc bulge. Mild to moderate bilateral hypertrophic facet  changes. Retained joint fluid in the facets can be seen with hypermobility.  Minimal canal stenosis. The bilateral foramina are patent.  L2-L3: Redundancy ligamentum flavum. Moderate bilateral hypertrophic facet  changes. Broad-based disc bulge. Mild to moderate canal stenosis. Mild  right and mild to moderate left foraminal narrowing.  L3-L4: Central annular fissure. Left foraminal annular fissure. Severe  bilateral hypertrophic facet changes. Redundancy ligamentum flavum.  Retained fluid bilateral facets can be seen with hypermobility.  Anterolisthesis with uncovering the disc. Superimposed disc bulge. Moderate  canal stenosis. Mild left foraminal narrowing. Mild to moderate right  foraminal stenosis.  L4-L5: Severe bilateral hypertrophic facet changes. Minimally retained  joint from the facets can be seen with hypermobility. Broad-based disc  bulge. Redundancy ligamentum flavum. Moderate canal stenosis. Minimal  bilateral foraminal narrowing.  L5-S1: Severe bilateral hypertrophic facet changes. Retained joint fluid in  the facets can be seen with hypermobility. Right central/foraminal annular  fissure. There is disc bulge. The canal is patent. Moderate left and mild  right foraminal stenosis.    Mild to moderate fatty replacement of the posterior paraspinal musculature.    STIR hyperintensity interspinous region at L3-L4 and L4-L5 degenerative in  nature. Minimal prevertebral edema is degenerative in nature. Partially  profiled colonic diverticulosis.    IMPRESSION:  1. Osteoporosis.  2. Chronic osteoporotic compression fractures of L3, L4, and L5.  3.  Multilevel degenerative disc and facet changes.  4. Moderate canal stenosis at L3-L4 and L4-L5.  5. Moderate left foraminal stenosis at L5-S1.  6. Remainder of stenosis no worse than mild.  7. Other findings as above. \"

## 2025-05-29 NOTE — H&P
[facility]  Neurological Surgery New Patient Clinic Note    Antonia Gonzalez  11/12/1950  [id number]  PCP: Fabi Blackwell MD  Referring Provider: Self    REASON FOR VISIT:  Chronic low-back pain with bilateral leg numbness    HISTORY OF PRESENT ILLNESS 5/29/2025:  Antonia Gonzalez is a(n) 74 year old female who reports six months of progressive numbness of both feet and aching leg muscles, superimposed on years of low-back pain that flares with standing and eases rapidly when she sits or lies down. She notes intermittent neck pain radiating to both hands, causing transient paresthesias while driving. Conservative measures--physical therapy and lumbar epidural steroid injections--gave only brief relief. She denies bowel/bladder dysfunction, saddle anesthesia, recent trauma, fevers or weight loss.    PAST MEDICAL HISTORY:  Past Medical History[1]    PAST SURGICAL HISTORY:  Past Surgical History[2]    FAMILY HISTORY:  family history is not on file.    SOCIAL HISTORY:   reports that she has been smoking cigarettes. She has never used smokeless tobacco.    ALLERGIES:  Allergies[3]    MEDICATIONS:  Medications Ordered Prior to Encounter[4]    REVIEW OF SYSTEMS:  All other systems were reviewed and were negative except for those previously mentioned in the HPI    PHYSICAL EXAMINATION:  General: No acute distress.  Respiratory: Non-labored respirations bilaterally. No audible wheezing  Cardiovascular: Extremities warm and well-perfused.  Abdomen: Soft, nontender, nondistended.   Musculoskeletal: Moves all extremities well, symmetrically.  Extremities: No edema.    NEUROLOGIC EXAMINATION:  Mental status: Alert and oriented x 3  Speech: Clear, fluent  Cranial nerves: PERRLA, EOMI, face symmetric, with normal strength and sensation, tongue and palate midline, SCM 5/5 bilaterally  Motor:     RIGHT  Delt 5/5   Bic 5/5  Tri 5/5   HI 5/5    5/5  IP 5/5   Quad 5/5   Ham 5/5   AT 5/5   EHL 5/5 Mena  5/5     LEFT    Delt 5/5   Bic 5/5  Tri 5/5   HI 5/5    5/5  IP 5/5   Quad 5/5   Ham 5/5   AT 5/5   EHL 5/5 Mena 5/5   No pronator drift  Tone: Normal  Atrophy/Fasciculations: None  Sensation: Normal to light touch, symmetric, no neglect  Cerebellar: Normal finger nose finger  Gait: Normal, nondistressed heel toe tandem gait      Reflexes: 2+ throughout, symmetric, no Narayan's    IMAGING:  MR lumbar 5/14/2025: Multilevel disc desiccation/broad-based bulges L2-S1; grade-1 anterolisthesis L3-4 & L4-5; chronic compression fractures L3-L5 with ~50 % L5 height loss; moderate canal stenosis and severe subarticular recess narrowing at L3-4 & L4-5; no fixed root compression; conus/cauda normal. Comparison MRI from 5/29/2019, 5/15/2019, and 10/17/2018  XR lumbar 05/10/2024: Mild right-convex degenerative scoliosis; trace retrolisthesis L2-3; anterolisthesis L3-4 & L4-5; chronic L5 biconcave deformity; multilevel osteophytes.  DEXA 12/03/2020: Left femoral-neck T-score -3.2 (osteoporosis); lumbar spine T-score -0.9 (normal).    ASSESSMENT:  Ms. Gonzalez is a 74-year-old female with severe osteoporosis, chronic lumbar compression fractures, grade-1 multilevel degenerative spondylolisthesis and coronal imbalance, presenting with mechanical low-back pain and activity-provoked bilateral lower-extremity dysesthesias without objective neurologic deficit. Her poor bone quality makes instrumented fusion high-risk; prognosis for symptomatic improvement with optimized non-operative management is guarded but reasonable.    PLAN:  - Obtain full-length standing scoliosis series (spine-pelvis-lower extremities) to assess leg-length inequality and global alignment.  - Refer to podiatry service for shoe-lift/orthosis based on radiographic measurements if discrepancy confirmed.  - Continue/optimize anti-osteoporotic therapy; discuss anabolic agent with PCP/endocrinology.  - Encourage low-impact daily ambulation as tolerated, with  pacing.  - Follow-up after imaging or sooner for new neurologic deficit.    Aime Knox MD  Neurological Surgery    [facility]  [address], [address]  [place], [place] [id number] [phone]  Pager [phone]  5/29/2025 4:24 PM      This note was created using a voice-recognition transcribing system. Incorrect words or phrases may have been missed during proofreading. Please interpret accordingly.    Total Time    New Patient Total Time       60  minutes.      Activities       Preparing to see the patient (chart/tests/imaging review).       Obtaining and/or reviewing separately obtained history.       Performing a medically appropriate examination and/or evaluation.       Counseling and educating the patient/family/caregiver.       Ordering medications, tests, or procedures.       Referring and communicating with other health care professionals (when not separately reported).       Documenting clincal information in the electronic or other health record.       Independently interpreting results (not separately reported).    Communicating results to the patient/family/caregiver.    Care coordination (not separately reported).         [1]   Past Medical History:   Arrhythmia   [2] No past surgical history on file.  [3]   Allergies  Allergen Reactions    Augmentin [Amoxicillin-Pot Clavulanate] RASH    Ibuprofen SWELLING   [4]   Current Outpatient Medications on File Prior to Visit   Medication Sig Dispense Refill    Sulfamethoxazole-TMP DS (BACTRIM DS) 800-160 MG Oral Tab per tablet Take 1 tab PO BID x 2 weeks. Take with food. 28 tablet 0    mupirocin 2 % External Ointment Apply to both nostrils BID x 2 weeks 15 g 1    escitalopram 10 MG Oral Tab   4    Flecainide Acetate 50 MG Oral Tab   4    metoprolol Tartrate 25 MG Oral Tab   4     No current facility-administered medications on file prior to visit.

## 2025-06-05 ENCOUNTER — OFFICE VISIT (OUTPATIENT)
Dept: SURGERY | Facility: CLINIC | Age: 75
End: 2025-06-05
Payer: MEDICARE

## 2025-06-05 VITALS
DIASTOLIC BLOOD PRESSURE: 71 MMHG | BODY MASS INDEX: 24.59 KG/M2 | HEIGHT: 66 IN | OXYGEN SATURATION: 98 % | HEART RATE: 59 BPM | WEIGHT: 153 LBS | SYSTOLIC BLOOD PRESSURE: 112 MMHG

## 2025-06-05 DIAGNOSIS — G56.03 BILATERAL CARPAL TUNNEL SYNDROME: ICD-10-CM

## 2025-06-05 DIAGNOSIS — M54.6 CHRONIC BILATERAL THORACIC BACK PAIN: ICD-10-CM

## 2025-06-05 DIAGNOSIS — Z87.81 HISTORY OF COMPRESSION FRACTURE OF SPINE: Primary | ICD-10-CM

## 2025-06-05 DIAGNOSIS — R20.0 BILATERAL HAND NUMBNESS: ICD-10-CM

## 2025-06-05 DIAGNOSIS — M54.42 CHRONIC BILATERAL LOW BACK PAIN WITH BILATERAL SCIATICA: ICD-10-CM

## 2025-06-05 DIAGNOSIS — M54.9 MUSCULOSKELETAL BACK PAIN: ICD-10-CM

## 2025-06-05 DIAGNOSIS — M51.369 DEGENERATION OF INTERVERTEBRAL DISC OF LUMBAR REGION WITHOUT DISCOGENIC BACK PAIN OR LOWER EXTREMITY PAIN: ICD-10-CM

## 2025-06-05 DIAGNOSIS — G89.29 CHRONIC BILATERAL LOW BACK PAIN WITHOUT SCIATICA: ICD-10-CM

## 2025-06-05 DIAGNOSIS — G89.29 CHRONIC BILATERAL THORACIC BACK PAIN: ICD-10-CM

## 2025-06-05 DIAGNOSIS — M21.70 LEG LENGTH DISCREPANCY: ICD-10-CM

## 2025-06-05 DIAGNOSIS — G89.29 CHRONIC BILATERAL LOW BACK PAIN WITH BILATERAL SCIATICA: ICD-10-CM

## 2025-06-05 DIAGNOSIS — M54.41 CHRONIC BILATERAL LOW BACK PAIN WITH BILATERAL SCIATICA: ICD-10-CM

## 2025-06-05 DIAGNOSIS — M54.50 CHRONIC BILATERAL LOW BACK PAIN WITHOUT SCIATICA: ICD-10-CM

## 2025-06-05 PROCEDURE — 99214 OFFICE O/P EST MOD 30 MIN: CPT | Performed by: STUDENT IN AN ORGANIZED HEALTH CARE EDUCATION/TRAINING PROGRAM

## 2025-06-05 RX ORDER — NYSTATIN 100000 U/G
1 CREAM TOPICAL DAILY
COMMUNITY
Start: 2025-05-15

## 2025-06-05 RX ORDER — HYDROCHLOROTHIAZIDE 12.5 MG/1
12.5 CAPSULE ORAL DAILY
COMMUNITY

## 2025-06-05 RX ORDER — FLECAINIDE ACETATE 100 MG/1
TABLET ORAL
COMMUNITY
Start: 2025-03-31

## 2025-06-05 RX ORDER — TRIAMCINOLONE ACETONIDE 1 MG/G
CREAM TOPICAL
COMMUNITY
Start: 2025-05-15

## 2025-06-05 RX ORDER — TERIPARATIDE 250 UG/ML
20 INJECTION, SOLUTION SUBCUTANEOUS DAILY
COMMUNITY

## 2025-06-05 RX ORDER — CICLOPIROX 7.7 MG/G
1 GEL TOPICAL 2 TIMES DAILY
COMMUNITY
Start: 2023-11-13

## 2025-06-05 RX ORDER — ESCITALOPRAM OXALATE 20 MG/1
20 TABLET ORAL DAILY
COMMUNITY
Start: 2025-03-31

## 2025-06-05 RX ORDER — TRIAMCINOLONE ACETONIDE 0.25 MG/G
1 CREAM TOPICAL 2 TIMES DAILY PRN
COMMUNITY

## 2025-06-05 RX ORDER — NICOTINE 21 MG/24HR
1 PATCH, TRANSDERMAL 24 HOURS TRANSDERMAL EVERY 24 HOURS
COMMUNITY

## 2025-06-05 RX ORDER — HYDROCHLOROTHIAZIDE 12.5 MG/1
12.5 CAPSULE ORAL EVERY MORNING
COMMUNITY
Start: 2023-08-28

## 2025-06-05 RX ORDER — APIXABAN 5 MG/1
5 TABLET, FILM COATED ORAL EVERY 12 HOURS
COMMUNITY
Start: 2023-09-01

## 2025-06-05 NOTE — PROGRESS NOTES
[facility]  Neurological Surgery Established Patient Clinic Note    Antonia Gonzalez  11/12/1950  [id number]  PCP: Fabi Blackwell MD  Referring Provider: Self    REASON FOR VISIT:  Chronic low-back pain with activity-provoked bilateral leg numbness    HISTORY OF PRESENT ILLNESS 5/29/2025:  Antonia Gonzalez is a(n) 74 year old female who reports six months of progressive numbness of both feet and aching leg muscles, superimposed on years of low-back pain that flares with standing and eases rapidly when she sits or lies down. She notes intermittent neck pain radiating to both hands, causing transient paresthesias while driving. Conservative measures--physical therapy and lumbar epidural steroid injections--gave only brief relief. She denies bowel/bladder dysfunction, saddle anesthesia, recent trauma, fevers or weight loss.    INTERVAL HISTORY 6/5/2025:  Antonia Gonzalez returns one week after her 5/29/2025 evaluation reporting stable low-back pain and bilateral foot numbness that arise after several minutes of walking and monae rapidly on sitting or lying. She reviewed today’s standing scoliosis and leg-length films, which show straight coronal alignment without discrepancy; she expresses relief at the findings but remains concerned about sagittal imbalance-related symptoms. She denies new weakness, bowel/bladder dysfunction, falls, fever, or weight change. She continues long-term Prolia injections and requests a permanent disability parking placard to shorten walking distance from parking lots.    PAST MEDICAL HISTORY:  Past Medical History[1]    PAST SURGICAL HISTORY:  Past Surgical History[2]    FAMILY HISTORY:  family history is not on file.    SOCIAL HISTORY:   reports that she has been smoking cigarettes. She has never used smokeless tobacco.    ALLERGIES:  Allergies[3]    MEDICATIONS:  Medications Ordered Prior to Encounter[4]    REVIEW OF SYSTEMS:  All other systems were reviewed and  were negative except for those previously mentioned in the HPI    PHYSICAL EXAMINATION:  General: No acute distress.  Respiratory: Non-labored respirations bilaterally. No audible wheezing  Cardiovascular: Extremities warm and well-perfused.  Abdomen: Soft, nontender, nondistended.   Musculoskeletal: Moves all extremities well, symmetrically.  Extremities: No edema.    NEUROLOGIC EXAMINATION:  Mental status: Alert and oriented x 3  Speech: Clear, fluent  Cranial nerves: PERRLA, EOMI, face symmetric, with normal strength and sensation, tongue and palate midline, SCM 5/5 bilaterally  Motor:     RIGHT  Delt 5/5   Bic 5/5  Tri 5/5   HI 5/5    5/5  IP 5/5   Quad 5/5   Ham 5/5   AT 5/5   EHL 5/5 Mena 5/5     LEFT    Delt 5/5   Bic 5/5  Tri 5/5   HI 5/5    5/5  IP 5/5   Quad 5/5   Ham 5/5   AT 5/5   EHL 5/5 Mena 5/5   No pronator drift  Tone: Normal  Atrophy/Fasciculations: None  Sensation: Normal to light touch, symmetric, no neglect  Cerebellar: Normal finger nose finger  Gait: Normal, nondistressed heel toe tandem gait      Reflexes: 2+ throughout, symmetric, no Narayan's    IMAGING:  MR lumbar 5/14/2025: Multilevel disc desiccation/broad-based bulges L2-S1; grade-1 anterolisthesis L3-4 & L4-5; chronic compression fractures L3-L5 with ~50 % L5 height loss; moderate canal stenosis and severe subarticular recess narrowing at L3-4 & L4-5; no fixed root compression; conus/cauda normal. Comparison MRI from 5/29/2019, 5/15/2019, and 10/17/2018        XR lumbar 05/9/2024: Mild right-convex degenerative scoliosis; trace retrolisthesis L2-3; anterolisthesis L3-4 & L4-5; chronic L5 biconcave deformity; multilevel osteophytes.  DEXA 12/03/2020: Left femoral-neck T-score -3.2 (osteoporosis); lumbar spine T-score -0.9 (normal).  XR scoliosis and bilateral leg surveys: Straight coronal spine and symmetric lower extremities with no measurable leg-length discrepancy or pelvic obliquity; sagittal vertical axis +4.8 cm  indicating mild positive sagittal balance; otherwise stable multilevel degenerative changes. LL 52, PI 59, SS 41 degrees.      ASSESSMENT:  Ms. Gonzalez is a 74-year-old woman with severe osteoporosis, multilevel grade-1 degenerative spondylolisthesis, and dynamic sagittal malalignment producing mechanical low-back pain and neurogenic claudication symptoms without objective neurologic deficit. Instrumented fusion is contraindicated given poor bone quality; prognosis for symptomatic improvement with optimized conservative management is guarded but reasonable.    PLAN:  - Issue [place] permanent disability parking placard form today and explain DMV submission process.  - Encourage daily seated or supine core-strengthening and pool-based exercises; avoid loaded standing lifts.  - Continue Prolia; coordinate with PCP/endocrinology regarding potential anabolic osteoporosis therapy.  - Use topical NSAID (diclofenac/Aspercreme) and heat for pain flares; avoid systemic narcotics.  - Maintain low-impact ambulation to tolerance with pacing (walk-sit-walk strategy).  - Return to clinic on an as needed basis.    Aime Knox MD  Neurological Surgery    [address], [address] [place], [place] [id number]  [phone]  Pager [phone]  6/5/2025 4:49 PM      This note was created using a voice-recognition transcribing system. Incorrect words or phrases may have been missed during proofreading. Please interpret accordingly.    Total Time    Established Patient Total Time       30  minutes.      Activities       Preparing to see the patient (chart/tests/imaging review).       Obtaining and/or reviewing separately obtained history.       Performing a medically appropriate examination and/or evaluation.       Counseling and educating the patient/family/caregiver.       Ordering medications, tests, or procedures.       Referring and communicating with other health care  professionals (when not separately reported).       Documenting clincal information in the electronic or other health record.       Independently interpreting results (not separately reported).    Communicating results to the patient/family/caregiver.    Care coordination (not separately reported).         [1]   Past Medical History:   Arrhythmia   [2] No past surgical history on file.  [3]   Allergies  Allergen Reactions    Augmentin [Amoxicillin-Pot Clavulanate] RASH    Ibuprofen SWELLING   [4]   Current Outpatient Medications on File Prior to Visit   Medication Sig Dispense Refill    Sulfamethoxazole-TMP DS (BACTRIM DS) 800-160 MG Oral Tab per tablet Take 1 tab PO BID x 2 weeks. Take with food. 28 tablet 0    mupirocin 2 % External Ointment Apply to both nostrils BID x 2 weeks 15 g 1    escitalopram 10 MG Oral Tab   4    Flecainide Acetate 50 MG Oral Tab   4    metoprolol Tartrate 25 MG Oral Tab   4     No current facility-administered medications on file prior to visit.

## 2025-06-05 NOTE — PROGRESS NOTES
The following individual(s) verbally consented to be recorded using ambient AI listening technology and understand that they can each withdraw their consent to this listening technology at any point by asking the clinician to turn off or pause the recording:    Patient name: Antonia DONATO Carlos  Additional names:

## 2025-06-17 ENCOUNTER — TELEPHONE (OUTPATIENT)
Dept: ORTHOPEDICS | Age: 75
End: 2025-06-17

## 2025-06-19 ENCOUNTER — TELEPHONE (OUTPATIENT)
Dept: SURGERY | Facility: CLINIC | Age: 75
End: 2025-06-19

## 2025-06-19 DIAGNOSIS — R20.0 BILATERAL HAND NUMBNESS: ICD-10-CM

## 2025-06-19 DIAGNOSIS — M54.9 MUSCULOSKELETAL BACK PAIN: ICD-10-CM

## 2025-06-19 DIAGNOSIS — G89.29 CHRONIC BILATERAL LOW BACK PAIN WITH BILATERAL SCIATICA: ICD-10-CM

## 2025-06-19 DIAGNOSIS — M54.50 CHRONIC BILATERAL LOW BACK PAIN WITHOUT SCIATICA: ICD-10-CM

## 2025-06-19 DIAGNOSIS — G56.03 BILATERAL CARPAL TUNNEL SYNDROME: ICD-10-CM

## 2025-06-19 DIAGNOSIS — M54.41 CHRONIC BILATERAL LOW BACK PAIN WITH BILATERAL SCIATICA: ICD-10-CM

## 2025-06-19 DIAGNOSIS — M54.42 CHRONIC BILATERAL LOW BACK PAIN WITH BILATERAL SCIATICA: ICD-10-CM

## 2025-06-19 DIAGNOSIS — M51.369 DEGENERATION OF INTERVERTEBRAL DISC OF LUMBAR REGION WITHOUT DISCOGENIC BACK PAIN OR LOWER EXTREMITY PAIN: ICD-10-CM

## 2025-06-19 DIAGNOSIS — G89.29 CHRONIC BILATERAL THORACIC BACK PAIN: ICD-10-CM

## 2025-06-19 DIAGNOSIS — G89.29 CHRONIC BILATERAL LOW BACK PAIN WITHOUT SCIATICA: ICD-10-CM

## 2025-06-19 DIAGNOSIS — M21.70 LEG LENGTH DISCREPANCY: Primary | ICD-10-CM

## 2025-06-19 DIAGNOSIS — Z87.81 HISTORY OF COMPRESSION FRACTURE OF SPINE: ICD-10-CM

## 2025-06-19 DIAGNOSIS — M54.6 CHRONIC BILATERAL THORACIC BACK PAIN: ICD-10-CM

## 2025-06-19 NOTE — TELEPHONE ENCOUNTER
Pt asking for referral or just names of podiatrist Dr Knox recommends.Pt requesting a PositiveIDt message and a phone call to advise.

## 2025-06-20 NOTE — TELEPHONE ENCOUNTER
Unfortunately, I was not entirely convinced that her x-rays demonstrated any significant leg discrepancy which is why I did not refer her in the first place.  I had previously brought [facility] up as [facility].  But it may be worth having her see Kyara in case there is something I missed.  I have placed a referral please let the patient know, but also share my concerns that he may not yield significant answers.    Aime Knox MD  Neurological Surgery    [address], [address] [place] [id number]  [phone]  Pager [phone]  6/20/2025 10:25 AM      This note was created using a voice-recognition transcribing system. Incorrect words or phrases may have been missed during proofreading. Please interpret accordingly.

## 2025-06-20 NOTE — TELEPHONE ENCOUNTER
Received message from Dr. Knox stating that he has placed a referral to Dr. Degroot, however he is not certain that this evaluation by podiatry will yield positive results for patient.     Messaged patient informing her of above with contact information to schedule the podiatry appointment.

## 2025-06-20 NOTE — TELEPHONE ENCOUNTER
Patient was seen by Dr Knox on 6-5-25.  Per OV note:  \"PLAN:  - Issue [place] permanent disability parking placard form today and explain DMV submission process.  - Encourage daily seated or supine core-strengthening and pool-based exercises; avoid loaded standing lifts.  - Continue Prolia; coordinate with PCP/endocrinology regarding potential anabolic osteoporosis therapy.  - Use topical NSAID (diclofenac/Aspercreme) and heat for pain flares; avoid systemic narcotics.  - Maintain low-impact ambulation to tolerance with pacing (walk-sit-walk strategy).  - Return to clinic on an as needed basis.\"      Pt is requesting a podiatry referral.  Please advise.

## 2025-06-26 RX ORDER — FLECAINIDE ACETATE 100 MG/1
100 TABLET ORAL 2 TIMES DAILY
Qty: 180 TABLET | Refills: 0 | OUTPATIENT
Start: 2025-06-26

## 2025-06-27 ASSESSMENT — ENCOUNTER SYMPTOMS
CONSTIPATION: 0
AGITATION: 0
ABDOMINAL PAIN: 0
ACTIVITY CHANGE: 0
CHILLS: 0
DIZZINESS: 0
FATIGUE: 0
CHEST TIGHTNESS: 0
NUMBNESS: 0
COUGH: 0
BLOOD IN STOOL: 0
SINUS PAIN: 0
RHINORRHEA: 0
UNEXPECTED WEIGHT CHANGE: 0
BACK PAIN: 0
HEADACHES: 0
NAUSEA: 0
CHOKING: 0
SHORTNESS OF BREATH: 0
SPEECH DIFFICULTY: 0
EYE REDNESS: 0
DIAPHORESIS: 0
EYE PAIN: 0
PHOTOPHOBIA: 0
NERVOUS/ANXIOUS: 0

## 2025-07-09 ENCOUNTER — APPOINTMENT (OUTPATIENT)
Dept: GENERAL RADIOLOGY | Age: 75
End: 2025-07-09
Attending: FAMILY MEDICINE

## 2025-07-09 ENCOUNTER — APPOINTMENT (OUTPATIENT)
Dept: SPORTS MEDICINE | Age: 75
End: 2025-07-09

## 2025-07-09 VITALS — WEIGHT: 156 LBS | HEIGHT: 67 IN | BODY MASS INDEX: 24.48 KG/M2

## 2025-07-09 DIAGNOSIS — M79.641 BILATERAL HAND PAIN: ICD-10-CM

## 2025-07-09 DIAGNOSIS — M79.642 BILATERAL HAND PAIN: Primary | ICD-10-CM

## 2025-07-09 DIAGNOSIS — G56.03 CARPAL TUNNEL SYNDROME ON BOTH SIDES: Primary | ICD-10-CM

## 2025-07-09 DIAGNOSIS — M79.642 BILATERAL HAND PAIN: ICD-10-CM

## 2025-07-09 DIAGNOSIS — M79.641 BILATERAL HAND PAIN: Primary | ICD-10-CM

## 2025-07-09 PROCEDURE — 99204 OFFICE O/P NEW MOD 45 MIN: CPT | Performed by: FAMILY MEDICINE

## 2025-07-09 PROCEDURE — 73130 X-RAY EXAM OF HAND: CPT | Performed by: FAMILY MEDICINE

## 2025-07-09 ASSESSMENT — ENCOUNTER SYMPTOMS
HEADACHES: 0
BACK PAIN: 0
RHINORRHEA: 0
CHOKING: 0
BLOOD IN STOOL: 0
FATIGUE: 0
NAUSEA: 0
EYE REDNESS: 0
PHOTOPHOBIA: 0
SINUS PAIN: 0
COUGH: 0
AGITATION: 0
UNEXPECTED WEIGHT CHANGE: 0
CHILLS: 0
ABDOMINAL PAIN: 0
NERVOUS/ANXIOUS: 0
DIAPHORESIS: 0
ACTIVITY CHANGE: 0
CONSTIPATION: 0
CHEST TIGHTNESS: 0
SPEECH DIFFICULTY: 0
SHORTNESS OF BREATH: 0
DIZZINESS: 0
EYE PAIN: 0
NUMBNESS: 0

## 2025-07-18 ENCOUNTER — APPOINTMENT (OUTPATIENT)
Dept: SPORTS MEDICINE | Age: 75
End: 2025-07-18

## 2025-07-28 ENCOUNTER — APPOINTMENT (OUTPATIENT)
Dept: INFUSION THERAPY | Age: 75
End: 2025-07-28
Attending: INTERNAL MEDICINE

## 2025-07-28 RX ORDER — APIXABAN 5 MG/1
5 TABLET, FILM COATED ORAL EVERY 12 HOURS
Qty: 180 TABLET | Refills: 0 | OUTPATIENT
Start: 2025-07-28

## 2025-07-29 RX ORDER — FLECAINIDE ACETATE 100 MG/1
100 TABLET ORAL 2 TIMES DAILY
Qty: 180 TABLET | Refills: 0 | OUTPATIENT
Start: 2025-07-29

## 2025-07-30 ENCOUNTER — MED REC SCAN ONLY (OUTPATIENT)
Dept: SURGERY | Facility: CLINIC | Age: 75
End: 2025-07-30

## 2025-08-01 ENCOUNTER — TELEPHONE (OUTPATIENT)
Dept: CARDIOLOGY | Age: 75
End: 2025-08-01

## 2025-08-01 RX ORDER — FLECAINIDE ACETATE 100 MG/1
100 TABLET ORAL 2 TIMES DAILY
Qty: 60 TABLET | Refills: 0 | Status: SHIPPED | OUTPATIENT
Start: 2025-08-01

## 2025-08-06 ENCOUNTER — APPOINTMENT (OUTPATIENT)
Dept: URBAN - METROPOLITAN AREA CLINIC 248 | Age: 75
Setting detail: DERMATOLOGY
End: 2025-08-06

## 2025-08-06 DIAGNOSIS — L82.0 INFLAMED SEBORRHEIC KERATOSIS: ICD-10-CM

## 2025-08-06 DIAGNOSIS — D49.2 NEOPLASM OF UNSPECIFIED BEHAVIOR OF BONE, SOFT TISSUE, AND SKIN: ICD-10-CM

## 2025-08-06 DIAGNOSIS — L30.4 ERYTHEMA INTERTRIGO: ICD-10-CM

## 2025-08-06 DIAGNOSIS — L21.8 OTHER SEBORRHEIC DERMATITIS: ICD-10-CM

## 2025-08-06 PROCEDURE — OTHER COUNSELING: OTHER

## 2025-08-06 PROCEDURE — OTHER PRESCRIPTION MEDICATION MANAGEMENT: OTHER

## 2025-08-06 PROCEDURE — OTHER LIQUID NITROGEN: OTHER

## 2025-08-06 PROCEDURE — OTHER PRESCRIPTION: OTHER

## 2025-08-06 PROCEDURE — OTHER MEDICATION COUNSELING: OTHER

## 2025-08-06 PROCEDURE — 11102 TANGNTL BX SKIN SINGLE LES: CPT | Mod: 59

## 2025-08-06 PROCEDURE — 17110 DESTRUCT B9 LESION 1-14: CPT

## 2025-08-06 PROCEDURE — 99213 OFFICE O/P EST LOW 20 MIN: CPT | Mod: 25

## 2025-08-06 PROCEDURE — OTHER BIOPSY BY SHAVE METHOD: OTHER

## 2025-08-06 PROCEDURE — OTHER MIPS QUALITY: OTHER

## 2025-08-06 RX ORDER — CLOBETASOL PROPIONATE 0.5 MG/ML
SOLUTION TOPICAL AS DIRECTED
Qty: 50 | Refills: 5 | Status: ERX | COMMUNITY
Start: 2025-08-06

## 2025-08-06 ASSESSMENT — LOCATION DETAILED DESCRIPTION DERM
LOCATION DETAILED: RIGHT MEDIAL FRONTAL SCALP
LOCATION DETAILED: LEFT MEDIAL FRONTAL SCALP
LOCATION DETAILED: RIGHT LATERAL BREAST 6-7:00 REGION
LOCATION DETAILED: LEFT CENTRAL MALAR CHEEK
LOCATION DETAILED: LEFT MEDIAL BREAST 6-7:00 REGION

## 2025-08-06 ASSESSMENT — LOCATION ZONE DERM
LOCATION ZONE: FACE
LOCATION ZONE: TRUNK
LOCATION ZONE: SCALP

## 2025-08-06 ASSESSMENT — LOCATION SIMPLE DESCRIPTION DERM
LOCATION SIMPLE: LEFT CHEEK
LOCATION SIMPLE: RIGHT BREAST
LOCATION SIMPLE: LEFT BREAST
LOCATION SIMPLE: RIGHT SCALP
LOCATION SIMPLE: LEFT SCALP

## 2025-08-25 ENCOUNTER — HOSPITAL ENCOUNTER (OUTPATIENT)
Dept: INFUSION THERAPY | Age: 75
Discharge: STILL ADMITTED OR ADMINISTRATIVE | End: 2025-08-25
Attending: INTERNAL MEDICINE

## 2025-08-25 ENCOUNTER — LAB SERVICES (OUTPATIENT)
Dept: LAB | Age: 75
End: 2025-08-25
Attending: INTERNAL MEDICINE

## 2025-08-25 VITALS
SYSTOLIC BLOOD PRESSURE: 121 MMHG | OXYGEN SATURATION: 95 % | RESPIRATION RATE: 16 BRPM | DIASTOLIC BLOOD PRESSURE: 69 MMHG | HEART RATE: 48 BPM | BODY MASS INDEX: 24.43 KG/M2 | TEMPERATURE: 97.3 F | WEIGHT: 155.98 LBS

## 2025-08-25 DIAGNOSIS — M81.0 SENILE OSTEOPOROSIS: Primary | ICD-10-CM

## 2025-08-25 DIAGNOSIS — E83.42 HYPOMAGNESEMIA: ICD-10-CM

## 2025-08-25 DIAGNOSIS — E83.39 HYPOPHOSPHATEMIA: ICD-10-CM

## 2025-08-25 PROCEDURE — 96372 THER/PROPH/DIAG INJ SC/IM: CPT | Performed by: INTERNAL MEDICINE

## 2025-08-25 PROCEDURE — 10002800 HB RX 250 W HCPCS: Performed by: INTERNAL MEDICINE

## 2025-08-25 RX ADMIN — DENOSUMAB 60 MG: 60 INJECTION SUBCUTANEOUS at 15:25

## 2025-08-25 ASSESSMENT — PAIN SCALES - GENERAL: PAINLEVEL_OUTOF10: 0

## 2025-08-27 ENCOUNTER — APPOINTMENT (OUTPATIENT)
Dept: CARDIOLOGY | Age: 75
End: 2025-08-27

## 2025-08-27 VITALS
HEART RATE: 68 BPM | DIASTOLIC BLOOD PRESSURE: 62 MMHG | OXYGEN SATURATION: 95 % | SYSTOLIC BLOOD PRESSURE: 97 MMHG | WEIGHT: 156 LBS | BODY MASS INDEX: 24.43 KG/M2

## 2025-08-27 DIAGNOSIS — I48.4 ATYPICAL ATRIAL FLUTTER  (CMD): ICD-10-CM

## 2025-08-27 DIAGNOSIS — I48.0 PAROXYSMAL ATRIAL FIBRILLATION  (CMD): Primary | ICD-10-CM

## 2025-08-27 LAB
ATRIAL RATE (BPM): 63
P AXIS (DEGREES): 44
PR-INTERVAL (MSEC): 186
QRS-INTERVAL (MSEC): 86
QT-INTERVAL (MSEC): 394
QTC: 403
R AXIS (DEGREES): 155
REPORT TEXT: NORMAL
T AXIS (DEGREES): -10
VENTRICULAR RATE EKG/MIN (BPM): 63

## 2025-08-27 RX ORDER — FLECAINIDE ACETATE 100 MG/1
100 TABLET ORAL 2 TIMES DAILY
Qty: 60 TABLET | Refills: 0 | Status: SHIPPED | OUTPATIENT
Start: 2025-08-27

## 2025-09-02 LAB
ATRIAL RATE (BPM): 63
P AXIS (DEGREES): 44
PR-INTERVAL (MSEC): 186
QRS-INTERVAL (MSEC): 86
QT-INTERVAL (MSEC): 394
QTC: 403
R AXIS (DEGREES): 155
REPORT TEXT: NORMAL
T AXIS (DEGREES): -10
VENTRICULAR RATE EKG/MIN (BPM): 63

## (undated) NOTE — LETTER
[facility]     [x] NEW APPLICANT  [address] [place] [address]  [] RENEWAL            Persons with Disabilities Certification for Parking Placard  *This form is valid for three months from your physician's signature date for a Temporary Placard and six months for a Permanent Placard.    NOTE TO ALL DISABILITY LICENSE PLATE OWNERS:  If you have a disability license plate, you MUST complete the form and renew your placard.    DIRECTIONS: Both sides of this document must be signed and completed fully. All fields are required.   Applicants complete Part 1. If the applicant is a MINOR, then Parent/Guardian(s) MUST also complete Part 2. The applicant's medical professional MUSTcomplete Part 3. If the applicant is applying for meter-exempt parking, his/her medical professional MUST also complete Part 4.    PART 1:   Applicant Information (MUST have a valid [place] 's license and/or ID card)  I hereby certify  that I meet the definition of a person with a disability as provided in 625 \[facility] [place] [facility]\"" 5/1-159.1, and I certify  that my physical condition entitles me to the issuance of a Persons with Disabilities Parking Placard. By affixing my signature below, I understand that the parking placard may not be used unless I am the  or passenger of the vehicle.     *If a  , please provide a copy of your  showing proof of service.     Disability Parking Placard # (if any)     Full Name of Person with Disability (If minor, complete Part 2 also)    Antonia Gonzalez  Male/Female  female  Date of Birth   11/12/1950    Valid [place] ’s License or ID Card # of Applicant                                                                                                  [place] Address  [id number] [facility] [place] [address]    Mailing Address if Different from Above   Telephone Number  [phone] (home)  Email Address   [email or web address]  ? Yes/No  No      Signature of Person with Disability Today’s Date  6/5/2025     PART 2:   For Parent or Legal Guardian (MUST have a valid 's license and/or ID card)  I hereby certify that the above applicant is a minor and I have primary responsibility for his/her transportation. By affixing my signature below, I understand that the disability placard is issued to the person with the disability and may not be used unless I am transporting the disabled person in the vehicle.     Name of Parent or Legal Guardian   Relationship to Person with Disability   Valid [place] ’s License or ID Card #          [place] Address Apt/Unit# City State Zip   Telephone Number Email Address   Signature of Parent or Legal Guardian Today’s Date  6/5/2025     Warning: Any misuse of the disability parking placard/plates or making a false application may result in the revocation of the placard, a 12-month suspension or revocation of your drivers license, and a fine of up to $1,000.    Temporary Disabled Parking Placard Applications -- May be taken to any [facility] facility or mailed in.  Permanent Disables Parking Placard Applications -- MUST be mailed to the following address:  , Persons with Disabilities Placard Unit, [address] [place] [address], [address], [place] [address].    *If you have a permanent disability placard and would like a Persons with Disabilities License Plate, please visit your local  facility to apply. You will need your permanent placard number and current plate number or [email or web address].     Please complete Page 2 to ensure timely processing.    Printed by authority of the [facility]. July 2021 -- 1 -- VSD 62.28          Part 3: Medical Eligibility Standards and Medical Professionals Certification  As the medical professional(s) executing this document and verifying the nature of the applicant's disability, I understand that making a false representation of a person's disability for  the purposes of obtaining any type of a disabled parking placard may result in suspension or revocation of my license and a fine of up to $1,000. As a licensed physician, advanced practice nurse, optometrist, chiropractor or physician's assistant, I certify the applicant has a condition that constitutes him/her as a person with disabilities.   Length of Disability: (Check one)   []   Temporary Disability; the duration of this disability is _____________________ (maximum 6 months)  [x]   Permanent Disability  []   Meter-Exempt Disability (Must complete and sign Part 4 also.)  Check all that apply (MUST check at least one):  []  Is restricted by a lung disease to such a degree that the person’s forced (respiratory) expiratory volume (FEV) for 1 second, when measured by spirometry, is less than 1 liter.  []   Uses a portable oxygen device.  []   Has a Class III or Class IV cardiac condition according to the standards set by the American Heart Association.  []   Cannot walk without the use of or assistance from a wheelchair, a walker, a crutch, a brace, a prosthetic device or another person.  [x]   Is severely limited in the ability to walk due to an arthritic, neurological, oncological or orthopedic condition.  [x]   Cannot walk 200 feet without stopping to rest because of one of the above five conditions.  Check all that apply: (MUST check at least one diagnosis):  []Amputation of extremity(s)_________________ [] Arthritis of the ______________________  []Spina Bifida     [x]Osteoarthritis of the ______Spine____________   []Multiple Sclerosis    [x] Chronic Pain due to __ ___Multiple spine fractures______________  []Quadriplegia/Paraplegia   [x] Legally Blind with limited mobility  [] Cerebral Palsy  [] Other Diagnosis: _____Nerve pain due to pinched nerves in the spine_____________________________________    If none of the above conditions apply, list the medical condition that impacts the person’s mobility.      Medical Professional’s Printed Name*   Aime Knox MD Specialty     Office Address   [facility] ([place], [place] [place]  [address] [address]  Dept: [phone]  Dept Fax: [phone]   Medical Professional’s Signature     State Professional License Number (NOT NPI#)  [id number] Today’s Date                  6/5/2025    Signature of Collaborating/Supervising Physician (if signed above by resident/assistant) Supervising State Professional License Number             PART 4: Medical Eligibility for Meter-Exempt Parking  The meter-exempt parking certification must be completed only when the applicant qualifies. To qualify, the applicant MUST have a VALID  [place] 's license, have an ambulatory disability described in Part 3, and also have one of the following conditions listed below.  Economic need is not a consideration for meter-exempt parking    The applicant is eligible for meter-exempt parking as provided by statue due to the following PERMANENT medical condition or disability:    Check all that apply:  [] Cannot manage, manipulate, or insert coins, or obtain tickets in parking meters/ticket machines due to lack of fine motor control of BOTH hands.  [] Cannot reach above his/her head to a height of 42 inches from the ground due to a lack of finger, hand or upper-extremity strength or mobility.  [] Cannot approach a parking meter due to his/her use of a wheelchair or other device for mobility.  [] Cannot walk more than 20 feet due to an orthopedic, neurological, cardiovascular, or lung condition in which the degree of debilitation is so severe that it almost completely impedes the ability to walk.  [] Missing a hand(s) or arm(s) or has permanently lost the use of a hand or arm.  [] Patient is under 18 years of age and incapable of driving.     Medical Professional’s Signature State Professional License Number (NOT NPI#)   Today’s Date                   6/5/2025    Signature of Collaborating/Supervising Physician (if signed above by resident/assistant) Supervising State Professional License Number     FOR  OFFICE USE ONLY     Parking Placard Number:  Expiration Date:   Issued By: Issued Date: